# Patient Record
Sex: FEMALE | Race: WHITE | Employment: UNEMPLOYED | URBAN - METROPOLITAN AREA
[De-identification: names, ages, dates, MRNs, and addresses within clinical notes are randomized per-mention and may not be internally consistent; named-entity substitution may affect disease eponyms.]

---

## 2019-12-24 ENCOUNTER — APPOINTMENT (OUTPATIENT)
Dept: CT IMAGING | Age: 34
End: 2019-12-24
Attending: EMERGENCY MEDICINE
Payer: COMMERCIAL

## 2019-12-24 ENCOUNTER — HOSPITAL ENCOUNTER (EMERGENCY)
Age: 34
Discharge: HOME | End: 2019-12-24
Attending: EMERGENCY MEDICINE
Payer: COMMERCIAL

## 2019-12-24 VITALS
BODY MASS INDEX: 36.67 KG/M2 | TEMPERATURE: 97.7 F | HEIGHT: 66 IN | DIASTOLIC BLOOD PRESSURE: 83 MMHG | RESPIRATION RATE: 15 BRPM | HEART RATE: 42 BPM | WEIGHT: 228.18 LBS | OXYGEN SATURATION: 100 % | SYSTOLIC BLOOD PRESSURE: 115 MMHG

## 2019-12-24 DIAGNOSIS — R11.2 NON-INTRACTABLE VOMITING WITH NAUSEA, UNSPECIFIED VOMITING TYPE: ICD-10-CM

## 2019-12-24 DIAGNOSIS — R10.13 ABDOMINAL PAIN, EPIGASTRIC: Primary | ICD-10-CM

## 2019-12-24 LAB
ALBUMIN SERPL-MCNC: 4.4 G/DL (ref 3.5–5)
ALBUMIN/GLOB SERPL: 1 {RATIO} (ref 1.1–2.2)
ALP SERPL-CCNC: 99 U/L (ref 45–117)
ALT SERPL-CCNC: 76 U/L (ref 12–78)
ANION GAP SERPL CALC-SCNC: 11 MMOL/L (ref 5–15)
APPEARANCE UR: CLEAR
AST SERPL-CCNC: 62 U/L (ref 15–37)
BACTERIA URNS QL MICRO: NEGATIVE /HPF
BASOPHILS # BLD: 0 K/UL (ref 0–0.1)
BASOPHILS NFR BLD: 0 % (ref 0–1)
BILIRUB SERPL-MCNC: 0.5 MG/DL (ref 0.2–1)
BILIRUB UR QL: NEGATIVE
BUN SERPL-MCNC: 17 MG/DL (ref 6–20)
BUN/CREAT SERPL: 18 (ref 12–20)
CALCIUM SERPL-MCNC: 9.9 MG/DL (ref 8.5–10.1)
CHLORIDE SERPL-SCNC: 98 MMOL/L (ref 97–108)
CO2 SERPL-SCNC: 28 MMOL/L (ref 21–32)
COLOR UR: ABNORMAL
COMMENT, HOLDF: NORMAL
CREAT SERPL-MCNC: 0.95 MG/DL (ref 0.55–1.02)
DIFFERENTIAL METHOD BLD: ABNORMAL
EOSINOPHIL # BLD: 0 K/UL (ref 0–0.4)
EOSINOPHIL NFR BLD: 0 % (ref 0–7)
EPITH CASTS URNS QL MICRO: ABNORMAL /LPF
ERYTHROCYTE [DISTWIDTH] IN BLOOD BY AUTOMATED COUNT: 14.3 % (ref 11.5–14.5)
GLOBULIN SER CALC-MCNC: 4.5 G/DL (ref 2–4)
GLUCOSE SERPL-MCNC: 96 MG/DL (ref 65–100)
GLUCOSE UR STRIP.AUTO-MCNC: NEGATIVE MG/DL
HCG UR QL: NEGATIVE
HCT VFR BLD AUTO: 45.5 % (ref 35–47)
HGB BLD-MCNC: 14.8 G/DL (ref 11.5–16)
HGB UR QL STRIP: ABNORMAL
IMM GRANULOCYTES # BLD AUTO: 0 K/UL (ref 0–0.04)
IMM GRANULOCYTES NFR BLD AUTO: 0 % (ref 0–0.5)
KETONES UR QL STRIP.AUTO: 40 MG/DL
LEUKOCYTE ESTERASE UR QL STRIP.AUTO: NEGATIVE
LIPASE SERPL-CCNC: 214 U/L (ref 73–393)
LYMPHOCYTES # BLD: 1.2 K/UL (ref 0.8–3.5)
LYMPHOCYTES NFR BLD: 12 % (ref 12–49)
MCH RBC QN AUTO: 26.2 PG (ref 26–34)
MCHC RBC AUTO-ENTMCNC: 32.5 G/DL (ref 30–36.5)
MCV RBC AUTO: 80.7 FL (ref 80–99)
MONOCYTES # BLD: 0.4 K/UL (ref 0–1)
MONOCYTES NFR BLD: 4 % (ref 5–13)
NEUTS SEG # BLD: 8.3 K/UL (ref 1.8–8)
NEUTS SEG NFR BLD: 84 % (ref 32–75)
NITRITE UR QL STRIP.AUTO: NEGATIVE
NRBC # BLD: 0 K/UL (ref 0–0.01)
NRBC BLD-RTO: 0 PER 100 WBC
PH UR STRIP: 6 [PH] (ref 5–8)
PLATELET # BLD AUTO: 301 K/UL (ref 150–400)
PMV BLD AUTO: 10.2 FL (ref 8.9–12.9)
POTASSIUM SERPL-SCNC: 3.9 MMOL/L (ref 3.5–5.1)
PROT SERPL-MCNC: 8.9 G/DL (ref 6.4–8.2)
PROT UR STRIP-MCNC: NEGATIVE MG/DL
RBC # BLD AUTO: 5.64 M/UL (ref 3.8–5.2)
RBC #/AREA URNS HPF: ABNORMAL /HPF (ref 0–5)
SAMPLES BEING HELD,HOLD: NORMAL
SODIUM SERPL-SCNC: 137 MMOL/L (ref 136–145)
SP GR UR REFRACTOMETRY: 1.01 (ref 1–1.03)
UR CULT HOLD, URHOLD: NORMAL
UROBILINOGEN UR QL STRIP.AUTO: 0.2 EU/DL (ref 0.2–1)
WBC # BLD AUTO: 9.9 K/UL (ref 3.6–11)
WBC URNS QL MICRO: ABNORMAL /HPF (ref 0–4)

## 2019-12-24 PROCEDURE — 36415 COLL VENOUS BLD VENIPUNCTURE: CPT

## 2019-12-24 PROCEDURE — 74177 CT ABD & PELVIS W/CONTRAST: CPT

## 2019-12-24 PROCEDURE — 83690 ASSAY OF LIPASE: CPT

## 2019-12-24 PROCEDURE — 80053 COMPREHEN METABOLIC PANEL: CPT

## 2019-12-24 PROCEDURE — 93005 ELECTROCARDIOGRAM TRACING: CPT

## 2019-12-24 PROCEDURE — 96374 THER/PROPH/DIAG INJ IV PUSH: CPT

## 2019-12-24 PROCEDURE — 85025 COMPLETE CBC W/AUTO DIFF WBC: CPT

## 2019-12-24 PROCEDURE — 74011000250 HC RX REV CODE- 250: Performed by: EMERGENCY MEDICINE

## 2019-12-24 PROCEDURE — 99284 EMERGENCY DEPT VISIT MOD MDM: CPT

## 2019-12-24 PROCEDURE — 74011250637 HC RX REV CODE- 250/637: Performed by: EMERGENCY MEDICINE

## 2019-12-24 PROCEDURE — 74011000258 HC RX REV CODE- 258: Performed by: EMERGENCY MEDICINE

## 2019-12-24 PROCEDURE — 74011250636 HC RX REV CODE- 250/636: Performed by: EMERGENCY MEDICINE

## 2019-12-24 PROCEDURE — 81001 URINALYSIS AUTO W/SCOPE: CPT

## 2019-12-24 PROCEDURE — 96375 TX/PRO/DX INJ NEW DRUG ADDON: CPT

## 2019-12-24 PROCEDURE — 81025 URINE PREGNANCY TEST: CPT

## 2019-12-24 PROCEDURE — 74011636320 HC RX REV CODE- 636/320: Performed by: EMERGENCY MEDICINE

## 2019-12-24 RX ORDER — CITALOPRAM 20 MG/1
20 TABLET, FILM COATED ORAL DAILY
COMMUNITY
End: 2021-01-02

## 2019-12-24 RX ORDER — FENTANYL CITRATE 50 UG/ML
50 INJECTION, SOLUTION INTRAMUSCULAR; INTRAVENOUS ONCE
Status: COMPLETED | OUTPATIENT
Start: 2019-12-24 | End: 2019-12-24

## 2019-12-24 RX ORDER — SODIUM CHLORIDE 9 MG/ML
10 INJECTION INTRAMUSCULAR; INTRAVENOUS; SUBCUTANEOUS ONCE
Status: COMPLETED | OUTPATIENT
Start: 2019-12-24 | End: 2019-12-24

## 2019-12-24 RX ORDER — SODIUM CHLORIDE 0.9 % (FLUSH) 0.9 %
5-40 SYRINGE (ML) INJECTION AS NEEDED
Status: DISCONTINUED | OUTPATIENT
Start: 2019-12-24 | End: 2019-12-25 | Stop reason: HOSPADM

## 2019-12-24 RX ORDER — OMEPRAZOLE 20 MG/1
20 CAPSULE, DELAYED RELEASE ORAL DAILY
COMMUNITY

## 2019-12-24 RX ORDER — ONDANSETRON 2 MG/ML
4 INJECTION INTRAMUSCULAR; INTRAVENOUS
Status: COMPLETED | OUTPATIENT
Start: 2019-12-24 | End: 2019-12-24

## 2019-12-24 RX ORDER — SODIUM CHLORIDE 0.9 % (FLUSH) 0.9 %
5-40 SYRINGE (ML) INJECTION EVERY 8 HOURS
Status: DISCONTINUED | OUTPATIENT
Start: 2019-12-24 | End: 2019-12-25 | Stop reason: HOSPADM

## 2019-12-24 RX ORDER — SODIUM CHLORIDE 9 MG/ML
50 INJECTION, SOLUTION INTRAVENOUS
Status: COMPLETED | OUTPATIENT
Start: 2019-12-24 | End: 2019-12-24

## 2019-12-24 RX ORDER — ONDANSETRON 4 MG/1
4 TABLET, ORALLY DISINTEGRATING ORAL
Qty: 12 TAB | Refills: 0 | OUTPATIENT
Start: 2019-12-24 | End: 2021-12-30

## 2019-12-24 RX ADMIN — SODIUM CHLORIDE 50 ML: 900 INJECTION, SOLUTION INTRAVENOUS at 20:40

## 2019-12-24 RX ADMIN — SODIUM CHLORIDE 1000 ML: 900 INJECTION, SOLUTION INTRAVENOUS at 19:16

## 2019-12-24 RX ADMIN — SODIUM CHLORIDE 10 ML: 9 INJECTION INTRAMUSCULAR; INTRAVENOUS; SUBCUTANEOUS at 20:40

## 2019-12-24 RX ADMIN — IOPAMIDOL 100 ML: 755 INJECTION, SOLUTION INTRAVENOUS at 20:39

## 2019-12-24 RX ADMIN — LIDOCAINE HYDROCHLORIDE 40 ML: 20 SOLUTION ORAL; TOPICAL at 21:15

## 2019-12-24 RX ADMIN — ONDANSETRON 4 MG: 2 INJECTION INTRAMUSCULAR; INTRAVENOUS at 19:16

## 2019-12-24 RX ADMIN — FENTANYL CITRATE 50 MCG: 50 INJECTION INTRAMUSCULAR; INTRAVENOUS at 19:56

## 2019-12-25 NOTE — DISCHARGE INSTRUCTIONS
Patient Education        Abdominal Pain: Care Instructions  Your Care Instructions    Abdominal pain has many possible causes. Some aren't serious and get better on their own in a few days. Others need more testing and treatment. If your pain continues or gets worse, you need to be rechecked and may need more tests to find out what is wrong. You may need surgery to correct the problem. Don't ignore new symptoms, such as fever, nausea and vomiting, urination problems, pain that gets worse, and dizziness. These may be signs of a more serious problem. Your doctor may have recommended a follow-up visit in the next 8 to 12 hours. If you are not getting better, you may need more tests or treatment. The doctor has checked you carefully, but problems can develop later. If you notice any problems or new symptoms, get medical treatment right away. Follow-up care is a key part of your treatment and safety. Be sure to make and go to all appointments, and call your doctor if you are having problems. It's also a good idea to know your test results and keep a list of the medicines you take. How can you care for yourself at home? · Rest until you feel better. · To prevent dehydration, drink plenty of fluids, enough so that your urine is light yellow or clear like water. Choose water and other caffeine-free clear liquids until you feel better. If you have kidney, heart, or liver disease and have to limit fluids, talk with your doctor before you increase the amount of fluids you drink. · If your stomach is upset, eat mild foods, such as rice, dry toast or crackers, bananas, and applesauce. Try eating several small meals instead of two or three large ones. · Wait until 48 hours after all symptoms have gone away before you have spicy foods, alcohol, and drinks that contain caffeine. · Do not eat foods that are high in fat. · Avoid anti-inflammatory medicines such as aspirin, ibuprofen (Advil, Motrin), and naproxen (Aleve). These can cause stomach upset. Talk to your doctor if you take daily aspirin for another health problem. When should you call for help? Call 911 anytime you think you may need emergency care. For example, call if:    · You passed out (lost consciousness).     · You pass maroon or very bloody stools.     · You vomit blood or what looks like coffee grounds.     · You have new, severe belly pain.    Call your doctor now or seek immediate medical care if:    · Your pain gets worse, especially if it becomes focused in one area of your belly.     · You have a new or higher fever.     · Your stools are black and look like tar, or they have streaks of blood.     · You have unexpected vaginal bleeding.     · You have symptoms of a urinary tract infection. These may include:  ? Pain when you urinate. ? Urinating more often than usual.  ? Blood in your urine.     · You are dizzy or lightheaded, or you feel like you may faint.    Watch closely for changes in your health, and be sure to contact your doctor if:    · You are not getting better after 1 day (24 hours). Where can you learn more? Go to http://yandywritewithamos.info/. Enter E833 in the search box to learn more about \"Abdominal Pain: Care Instructions. \"  Current as of: June 26, 2019  Content Version: 12.2  © 4440-2613 [employer] Care instructions adapted under license by [employer] (which disclaims liability or warranty for this information). If you have questions about a medical condition or this instruction, always ask your healthcare professional. [place] [address] any warranty or liability for your use of this information. Patient Education        Nausea and Vomiting: Care Instructions  Your Care Instructions    When you are nauseated, you may feel weak and sweaty and notice a lot of saliva in your mouth. Nausea often leads to vomiting.  Most of the time you do not need to worry about nausea and vomiting, but they can be signs of other illnesses. Two common causes of nausea and vomiting are stomach flu and food poisoning. Nausea and vomiting from viral stomach flu will usually start to improve within 24 hours. Nausea and vomiting from food poisoning may last from 12 to 48 hours. The doctor has checked you carefully, but problems can develop later. If you notice any problems or new symptoms, get medical treatment right away. Follow-up care is a key part of your treatment and safety. Be sure to make and go to all appointments, and call your doctor if you are having problems. It's also a good idea to know your test results and keep a list of the medicines you take. How can you care for yourself at home? · To prevent dehydration, drink plenty of fluids, enough so that your urine is light yellow or clear like water. Choose water and other caffeine-free clear liquids until you feel better. If you have kidney, heart, or liver disease and have to limit fluids, talk with your doctor before you increase the amount of fluids you drink. · Rest in bed until you feel better. · When you are able to eat, try clear soups, mild foods, and liquids until all symptoms are gone for 12 to 48 hours. Other good choices include dry toast, crackers, cooked cereal, and gelatin dessert, such as Jell-O. When should you call for help? Call 911 anytime you think you may need emergency care. For example, call if:    · You passed out (lost consciousness).    Call your doctor now or seek immediate medical care if:    · You have symptoms of dehydration, such as:  ? Dry eyes and a dry mouth. ? Passing only a little dark urine. ?  Feeling thirstier than usual.     · You have new or worsening belly pain.     · You have a new or higher fever.     · You vomit blood or what looks like coffee grounds.    Watch closely for changes in your health, and be sure to contact your doctor if:    · You have ongoing nausea and vomiting.     · Your vomiting is getting worse.     · Your vomiting lasts longer than 2 days.     · You are not getting better as expected. Where can you learn more? Go to http://yandy-amos.info/. Enter 25 494182 in the search box to learn more about \"Nausea and Vomiting: Care Instructions. \"  Current as of: June 26, 2019  Content Version: 12.2  © 7674-1690 [employer]. Care instructions adapted under license by [employer] (which disclaims liability or warranty for this information). If you have questions about a medical condition or this instruction, always ask your healthcare professional. [place] [address] any warranty or liability for your use of this information.

## 2019-12-25 NOTE — ED NOTES
Verbal shift change report given to Reza Segundo RN  (oncoming nurse) by Aracelis Bermudez RN (offgoing nurse). Report included the following information SBAR, ED Summary and MAR.

## 2019-12-25 NOTE — ED PROVIDER NOTES
History of GERD, depression, arthritis; presents accompanied by her  with complaints of \"heartburn\", nausea, vomiting, diarrhea. Her symptoms began a week ago. She has been taking Pepto-Bismol, Tums, generic Pepcid without sustained relief. She saw her primary care doctor earlier today and was prescribed omeprazole. Her symptoms worsened as the day went on. She states that she has been vomiting approximately 10 times a day for the past week. Today she estimates more than 20 times. She has had constant epigastric pain that radiates to her back. She reports watery diarrhea that is been blackish and green at times. She estimates 5 episodes of diarrhea per day. She states that she is status post appendectomy, cholecystectomy, and bilateral tubal ligation. She states that she has had pretty significant \"heartburn\" in the past but nothing that has lasted this long. Past Medical History:   Diagnosis Date    Arthritis     Depression     GERD (gastroesophageal reflux disease)     Heart murmur        Past Surgical History:   Procedure Laterality Date    HX APPENDECTOMY      HX CHOLECYSTECTOMY           History reviewed. No pertinent family history.     Social History     Socioeconomic History    Marital status: Not on file     Spouse name: Not on file    Number of children: Not on file    Years of education: Not on file    Highest education level: Not on file   Occupational History    Not on file   Social Needs    Financial resource strain: Not on file    Food insecurity:     Worry: Not on file     Inability: Not on file    Transportation needs:     Medical: Not on file     Non-medical: Not on file   Tobacco Use    Smoking status: Never Smoker    Smokeless tobacco: Never Used   Substance and Sexual Activity    Alcohol use: Not Currently     Frequency: Never    Drug use: Never    Sexual activity: Not on file   Lifestyle    Physical activity:     Days per week: Not on file Minutes per session: Not on file    Stress: Not on file   Relationships    Social connections:     Talks on phone: Not on file     Gets together: Not on file     Attends Anabaptist service: Not on file     Active member of club or organization: Not on file     Attends meetings of clubs or organizations: Not on file     Relationship status: Not on file    Intimate partner violence:     Fear of current or ex partner: Not on file     Emotionally abused: Not on file     Physically abused: Not on file     Forced sexual activity: Not on file   Other Topics Concern    Not on file   Social History Narrative    Not on file         ALLERGIES: Bee venom protein (honey bee)    Review of Systems   All other systems reviewed and are negative. Vitals:    12/24/19 1910   BP: (!) 163/100   Pulse: (!) 45   Resp: 20   Temp: 97.7 °F (36.5 °C)   SpO2: 99%   Weight: 103.5 kg (228 lb 2.8 oz)   Height: 5' 5.5\" (1.664 m)            Physical Exam  Vitals signs and nursing note reviewed. Constitutional:       Appearance: She is well-developed. Comments: Overweight; appears uncomfortable   HENT:      Head: Normocephalic and atraumatic. Eyes:      Conjunctiva/sclera: Conjunctivae normal.   Neck:      Musculoskeletal: Neck supple. Trachea: No tracheal deviation. Cardiovascular:      Rate and Rhythm: Normal rate and regular rhythm. Heart sounds: Normal heart sounds. No murmur. No friction rub. No gallop. Pulmonary:      Effort: Pulmonary effort is normal.      Breath sounds: Normal breath sounds. Abdominal:      Palpations: Abdomen is soft. Comments: Mild epigastric tenderness   Musculoskeletal:         General: No deformity. Skin:     General: Skin is warm and dry. Neurological:      Mental Status: She is alert.       Comments: oriented          MDM       Procedures    EKG: Sinus bradycardia; rate of 43; normal ST, Keith Romano MD  7:44 PM    Progress Note:  Results, treatment, and follow up plan have been discussed with patient/. Questions were answered. Little Howell MD    Assessment/plan: Epigastric pain, nausea, vomiting, diarrhea for the past week. I suspect gastritis; reassuring appearance and exam; VSS; CBC, CMP, lipase, abdominal CT ok; home with omeprazole, Zofran and PCP f/u.   Little Howell MD

## 2019-12-25 NOTE — ED TRIAGE NOTES
Triage: pt c/o epigastric/GERD pain and N/V starting x1 week ago. Pt was seen by PCP today and given omeprazole and citalopram but symptoms have not gotten better. Denies fever, urinary symptoms.

## 2019-12-26 LAB
ATRIAL RATE: 43 BPM
CALCULATED P AXIS, ECG09: 28 DEGREES
CALCULATED T AXIS, ECG11: 32 DEGREES
DIAGNOSIS, 93000: NORMAL
P-R INTERVAL, ECG05: 156 MS
Q-T INTERVAL, ECG07: 468 MS
QRS DURATION, ECG06: 76 MS
QTC CALCULATION (BEZET), ECG08: 395 MS
VENTRICULAR RATE, ECG03: 43 BPM

## 2021-01-02 ENCOUNTER — APPOINTMENT (OUTPATIENT)
Dept: CT IMAGING | Age: 36
End: 2021-01-02
Attending: EMERGENCY MEDICINE
Payer: COMMERCIAL

## 2021-01-02 ENCOUNTER — HOSPITAL ENCOUNTER (EMERGENCY)
Age: 36
Discharge: HOME | End: 2021-01-03
Attending: EMERGENCY MEDICINE
Payer: COMMERCIAL

## 2021-01-02 DIAGNOSIS — R19.7 DIARRHEA, UNSPECIFIED TYPE: ICD-10-CM

## 2021-01-02 DIAGNOSIS — R11.2 NON-INTRACTABLE VOMITING WITH NAUSEA, UNSPECIFIED VOMITING TYPE: Primary | ICD-10-CM

## 2021-01-02 LAB
ALBUMIN SERPL-MCNC: 3.6 G/DL (ref 3.5–5)
ALBUMIN/GLOB SERPL: 0.9 {RATIO} (ref 1.1–2.2)
ALP SERPL-CCNC: 82 U/L (ref 45–117)
ALT SERPL-CCNC: 16 U/L (ref 12–78)
ANION GAP SERPL CALC-SCNC: 8 MMOL/L (ref 5–15)
AST SERPL-CCNC: 27 U/L (ref 15–37)
BASOPHILS # BLD: 0 K/UL (ref 0–0.1)
BASOPHILS NFR BLD: 0 % (ref 0–1)
BILIRUB SERPL-MCNC: 0.2 MG/DL (ref 0.2–1)
BUN SERPL-MCNC: 20 MG/DL (ref 6–20)
BUN/CREAT SERPL: 19 (ref 12–20)
CALCIUM SERPL-MCNC: 8.8 MG/DL (ref 8.5–10.1)
CHLORIDE SERPL-SCNC: 105 MMOL/L (ref 97–108)
CO2 SERPL-SCNC: 28 MMOL/L (ref 21–32)
CREAT SERPL-MCNC: 1.07 MG/DL (ref 0.55–1.02)
DIFFERENTIAL METHOD BLD: NORMAL
EOSINOPHIL # BLD: 0.3 K/UL (ref 0–0.4)
EOSINOPHIL NFR BLD: 4 % (ref 0–7)
ERYTHROCYTE [DISTWIDTH] IN BLOOD BY AUTOMATED COUNT: 13.5 % (ref 11.5–14.5)
GLOBULIN SER CALC-MCNC: 3.8 G/DL (ref 2–4)
GLUCOSE SERPL-MCNC: 100 MG/DL (ref 65–100)
HCT VFR BLD AUTO: 39.5 % (ref 35–47)
HGB BLD-MCNC: 12.7 G/DL (ref 11.5–16)
IMM GRANULOCYTES # BLD AUTO: 0 K/UL (ref 0–0.04)
IMM GRANULOCYTES NFR BLD AUTO: 0 % (ref 0–0.5)
LIPASE SERPL-CCNC: 242 U/L (ref 73–393)
LYMPHOCYTES # BLD: 1.7 K/UL (ref 0.8–3.5)
LYMPHOCYTES NFR BLD: 23 % (ref 12–49)
MCH RBC QN AUTO: 26.9 PG (ref 26–34)
MCHC RBC AUTO-ENTMCNC: 32.2 G/DL (ref 30–36.5)
MCV RBC AUTO: 83.7 FL (ref 80–99)
MONOCYTES # BLD: 0.4 K/UL (ref 0–1)
MONOCYTES NFR BLD: 5 % (ref 5–13)
NEUTS SEG # BLD: 4.8 K/UL (ref 1.8–8)
NEUTS SEG NFR BLD: 68 % (ref 32–75)
NRBC # BLD: 0 K/UL (ref 0–0.01)
NRBC BLD-RTO: 0 PER 100 WBC
PLATELET # BLD AUTO: 214 K/UL (ref 150–400)
PMV BLD AUTO: 10.2 FL (ref 8.9–12.9)
POTASSIUM SERPL-SCNC: 4 MMOL/L (ref 3.5–5.1)
PROT SERPL-MCNC: 7.4 G/DL (ref 6.4–8.2)
RBC # BLD AUTO: 4.72 M/UL (ref 3.8–5.2)
SODIUM SERPL-SCNC: 141 MMOL/L (ref 136–145)
WBC # BLD AUTO: 7.2 K/UL (ref 3.6–11)

## 2021-01-02 PROCEDURE — 83690 ASSAY OF LIPASE: CPT

## 2021-01-02 PROCEDURE — 80053 COMPREHEN METABOLIC PANEL: CPT

## 2021-01-02 PROCEDURE — 99284 EMERGENCY DEPT VISIT MOD MDM: CPT

## 2021-01-02 PROCEDURE — 36415 COLL VENOUS BLD VENIPUNCTURE: CPT

## 2021-01-02 PROCEDURE — 85025 COMPLETE CBC W/AUTO DIFF WBC: CPT

## 2021-01-02 PROCEDURE — 74177 CT ABD & PELVIS W/CONTRAST: CPT

## 2021-01-02 PROCEDURE — 74011250636 HC RX REV CODE- 250/636: Performed by: EMERGENCY MEDICINE

## 2021-01-02 PROCEDURE — 96374 THER/PROPH/DIAG INJ IV PUSH: CPT

## 2021-01-02 PROCEDURE — 74011000636 HC RX REV CODE- 636

## 2021-01-02 RX ORDER — ESCITALOPRAM OXALATE 20 MG/1
20 TABLET ORAL DAILY
COMMUNITY

## 2021-01-02 RX ORDER — BUPROPION HYDROCHLORIDE 150 MG/1
150 TABLET ORAL
COMMUNITY

## 2021-01-02 RX ORDER — SODIUM CHLORIDE 0.9 % (FLUSH) 0.9 %
10 SYRINGE (ML) INJECTION
Status: COMPLETED | OUTPATIENT
Start: 2021-01-03 | End: 2021-01-02

## 2021-01-02 RX ORDER — ONDANSETRON 2 MG/ML
4 INJECTION INTRAMUSCULAR; INTRAVENOUS
Status: COMPLETED | OUTPATIENT
Start: 2021-01-02 | End: 2021-01-02

## 2021-01-02 RX ORDER — SODIUM CHLORIDE 9 MG/ML
50 INJECTION, SOLUTION INTRAVENOUS
Status: COMPLETED | OUTPATIENT
Start: 2021-01-03 | End: 2021-01-02

## 2021-01-02 RX ADMIN — IOPAMIDOL 100 ML: 755 INJECTION, SOLUTION INTRAVENOUS at 23:37

## 2021-01-02 RX ADMIN — ONDANSETRON 4 MG: 2 INJECTION INTRAMUSCULAR; INTRAVENOUS at 22:21

## 2021-01-02 RX ADMIN — Medication 10 ML: at 23:37

## 2021-01-02 RX ADMIN — SODIUM CHLORIDE 50 ML/HR: 900 INJECTION, SOLUTION INTRAVENOUS at 23:37

## 2021-01-02 NOTE — LETTER
[address] 
[facility] 
[address] [address] 
[phone] Work/School Note Date: 1/2/2021 To Whom It May concern: 
 
Debi George escorted his wife, Tita Jacobsen to the emergency department. She was seen and treated today in the emergency room by the following provider(s): 
Attending Provider: Obinna Calabrese MD.   
 
 
Sincerely, Fermin Lesch, RN

## 2021-01-03 VITALS
RESPIRATION RATE: 16 BRPM | HEART RATE: 60 BPM | DIASTOLIC BLOOD PRESSURE: 82 MMHG | WEIGHT: 273.37 LBS | SYSTOLIC BLOOD PRESSURE: 116 MMHG | BODY MASS INDEX: 45.55 KG/M2 | HEIGHT: 65 IN | TEMPERATURE: 98 F | OXYGEN SATURATION: 100 %

## 2021-01-03 RX ORDER — LOPERAMIDE HYDROCHLORIDE 2 MG/1
2 CAPSULE ORAL
Qty: 20 CAP | Refills: 0 | Status: SHIPPED | OUTPATIENT
Start: 2021-01-03 | End: 2021-01-03

## 2021-01-03 RX ORDER — DICYCLOMINE HYDROCHLORIDE 20 MG/1
20 TABLET ORAL EVERY 6 HOURS
Qty: 20 TAB | Refills: 0 | Status: SHIPPED | OUTPATIENT
Start: 2021-01-03

## 2021-01-03 RX ORDER — PROMETHAZINE HYDROCHLORIDE 25 MG/1
25 TABLET ORAL
Qty: 12 TAB | Refills: 0 | Status: SHIPPED | OUTPATIENT
Start: 2021-01-03 | End: 2021-01-03

## 2021-01-03 NOTE — ED NOTES
Dr. Tiana Stinson reviewed discharge instructions with the patient. The patient verbalized understanding. Patient ambulated out of the emergency department without assistance. Patient remains in pain, but is free of nausea. Patient is in no apparent distress.

## 2021-01-03 NOTE — ED TRIAGE NOTES
Patient presents to the emergency department reporting lower bilateral abdominal pain. Patient reports the pain has been ongoing for a couple of weeks. Patient states she was seen at [facility] and sent here.   Patient had negative urine pregnancy test and urinalysis at [facility].

## 2021-01-03 NOTE — ED PROVIDER NOTES
71-year-old female history of GERD and depression who with history of cholecystectomy and appendectomy with retained biliary stone sometime last year presents to the emergency department today with a chief complaint of a couple of weeks of abdominal pain. She has difficulty describing the pain but tells me that is generalized and dull and has been getting worse. It is associated with nausea without vomiting. She has had no fevers. She tells me she has had headaches associated with the weather change. The history is provided by the patient and medical records. Abdominal Pain   This is a new problem. The current episode started more than 1 week ago. The problem occurs constantly. The problem has not changed since onset. The pain is located in the generalized abdominal region. The pain is moderate. Associated symptoms include nausea and headaches. Pertinent negatives include no fever, no diarrhea, no vomiting, no constipation, no dysuria, no frequency, no hematuria, no arthralgias, no myalgias, no trauma, no chest pain and no back pain. The patient's surgical history includes appendectomy and cholecystectomy. Past Medical History:   Diagnosis Date    Arthritis     Depression     GERD (gastroesophageal reflux disease)     Heart murmur        Past Surgical History:   Procedure Laterality Date    HX APPENDECTOMY      HX CHOLECYSTECTOMY           History reviewed. No pertinent family history.     Social History     Socioeconomic History    Marital status:      Spouse name: Not on file    Number of children: Not on file    Years of education: Not on file    Highest education level: Not on file   Occupational History    Not on file   Social Needs    Financial resource strain: Not on file    Food insecurity     Worry: Not on file     Inability: Not on file    Transportation needs     Medical: Not on file     Non-medical: Not on file   Tobacco Use    Smoking status: Never Smoker    Smokeless tobacco: Never Used   Substance and Sexual Activity    Alcohol use: Not Currently     Frequency: Never    Drug use: Never    Sexual activity: Not on file   Lifestyle    Physical activity     Days per week: Not on file     Minutes per session: Not on file    Stress: Not on file   Relationships    Social connections     Talks on phone: Not on file     Gets together: Not on file     Attends Yazidi service: Not on file     Active member of club or organization: Not on file     Attends meetings of clubs or organizations: Not on file     Relationship status: Not on file    Intimate partner violence     Fear of current or ex partner: Not on file     Emotionally abused: Not on file     Physically abused: Not on file     Forced sexual activity: Not on file   Other Topics Concern    Not on file   Social History Narrative    Not on file         ALLERGIES: Bee venom protein (honey bee)    Review of Systems   Constitutional: Negative for fatigue and fever. HENT: Negative for sneezing and sore throat. Respiratory: Negative for cough and shortness of breath. Cardiovascular: Negative for chest pain and leg swelling. Gastrointestinal: Positive for abdominal pain and nausea. Negative for constipation, diarrhea and vomiting. Genitourinary: Negative for difficulty urinating, dysuria, frequency and hematuria. Musculoskeletal: Negative for arthralgias, back pain and myalgias. Skin: Negative for color change and rash. Neurological: Positive for headaches. Negative for weakness. Psychiatric/Behavioral: Negative for agitation and behavioral problems. Vitals:    01/02/21 2205   Weight: 124 kg (273 lb 5.9 oz)   Height: 5' 5\" (1.651 m)            Physical Exam  Constitutional:       General: She is not in acute distress. Appearance: She is well-developed. She is obese. She is not ill-appearing, toxic-appearing or diaphoretic. HENT:      Head: Normocephalic and atraumatic.       Nose: Nose normal. Mouth/Throat:      Mouth: Mucous membranes are moist.      Pharynx: Oropharynx is clear. Eyes:      Extraocular Movements: Extraocular movements intact. Conjunctiva/sclera: Conjunctivae normal.      Pupils: Pupils are equal, round, and reactive to light. Neck:      Musculoskeletal: Normal range of motion and neck supple. Cardiovascular:      Rate and Rhythm: Normal rate and regular rhythm. Pulses: Normal pulses. Heart sounds: Normal heart sounds. Pulmonary:      Effort: Pulmonary effort is normal. No respiratory distress. Breath sounds: Normal breath sounds. Abdominal:      General: There is no distension. Palpations: Abdomen is soft. There is no mass. Tenderness: There is generalized abdominal tenderness and tenderness in the left upper quadrant. There is no guarding or rebound. Musculoskeletal: Normal range of motion. General: No swelling, tenderness, deformity or signs of injury. Right lower leg: No edema. Left lower leg: No edema. Skin:     General: Skin is warm and dry. Capillary Refill: Capillary refill takes less than 2 seconds. Neurological:      General: No focal deficit present. Mental Status: She is alert and oriented to person, place, and time. Psychiatric:         Mood and Affect: Mood normal.         Behavior: Behavior normal.          MDM  Number of Diagnoses or Management Options  Diarrhea, unspecified type  Non-intractable vomiting with nausea, unspecified vomiting type  Diagnosis management comments: 18-year-old female presents as above with  abdominal pain. Her work-up has been reassuring with labs and CT scan. Plan to treat symptomatically and  discharg with instructions to follow-up with primary care and return if needed.        Amount and/or Complexity of Data Reviewed  Clinical lab tests: reviewed  Tests in the radiology section of CPT®: reviewed           Procedures

## 2021-04-04 LAB — SARS-COV-2, NAA: NOT DETECTED

## 2021-04-28 ENCOUNTER — OFFICE VISIT (OUTPATIENT)
Dept: CARDIOLOGY CLINIC | Age: 36
End: 2021-04-28
Payer: COMMERCIAL

## 2021-04-28 VITALS
RESPIRATION RATE: 13 BRPM | OXYGEN SATURATION: 97 % | SYSTOLIC BLOOD PRESSURE: 124 MMHG | DIASTOLIC BLOOD PRESSURE: 80 MMHG | WEIGHT: 280 LBS | BODY MASS INDEX: 46.65 KG/M2 | HEIGHT: 65 IN | HEART RATE: 58 BPM

## 2021-04-28 DIAGNOSIS — R01.1 MURMUR: ICD-10-CM

## 2021-04-28 DIAGNOSIS — E66.01 MORBID OBESITY (HCC): ICD-10-CM

## 2021-04-28 DIAGNOSIS — R06.02 SHORTNESS OF BREATH: ICD-10-CM

## 2021-04-28 DIAGNOSIS — R07.9 CHEST PAIN, UNSPECIFIED TYPE: Primary | ICD-10-CM

## 2021-04-28 PROBLEM — R06.09 DOE (DYSPNEA ON EXERTION): Status: ACTIVE | Noted: 2021-04-28

## 2021-04-28 PROCEDURE — 99244 OFF/OP CNSLTJ NEW/EST MOD 40: CPT | Performed by: SPECIALIST

## 2021-04-28 PROCEDURE — 93000 ELECTROCARDIOGRAM COMPLETE: CPT | Performed by: SPECIALIST

## 2021-04-28 NOTE — PROGRESS NOTES
HISTORY OF PRESENT ILLNESS  Gregorio Che is a 30 y.o. female. She is referred for evaluation of chest pain. She has been having episodes of chest pain as well as shortness of breath with exertion. These may have started as long as low as 3-1/2 years. It sounds like she saw a cardiologist at [facility]argas around that time who did some testing that was unremarkable. She may have been pregnant at that time. She also had spells as a child where she would suddenly become limp. She does not smoke or drink alcohol. She does have a family history of heart disease. Her blood pressure will occasionally go low at home in between 90 and 957 systolic. She thinks her cholesterol is well controlled and she does not have diabetes or treated hypertension. HPI  Patient Active Problem List   Diagnosis Code    Chest pain at rest R07.9    CORTEZ (dyspnea on exertion) R06.00    Morbid obesity (HCC) E66.01     Current Outpatient Medications   Medication Sig Dispense Refill    dicyclomine (BENTYL) 20 mg tablet Take 1 Tab by mouth every six (6) hours. 20 Tab 0    buPROPion XL (WELLBUTRIN XL) 150 mg tablet Take 150 mg by mouth every morning.  escitalopram oxalate (LEXAPRO) 20 mg tablet Take 20 mg by mouth daily.  omeprazole (PRILOSEC) 20 mg capsule Take 20 mg by mouth daily.  ondansetron (ZOFRAN ODT) 4 mg disintegrating tablet Take 1 Tab by mouth every eight (8) hours as needed for Nausea. 12 Tab 0     Past Medical History:   Diagnosis Date    Arthritis     Depression     GERD (gastroesophageal reflux disease)     Heart murmur      Past Surgical History:   Procedure Laterality Date    HX APPENDECTOMY      HX CHOLECYSTECTOMY         Review of Systems   Respiratory: Positive for shortness of breath. Cardiovascular: Positive for chest pain. Neurological: Positive for dizziness. All other systems reviewed and are negative.     Visit Vitals  /80 (BP 1 Location: Left arm, BP Patient Position: Sitting, BP Cuff Size: Adult)   Pulse (!) 58   Resp 13   Ht 5' 5\" (1.651 m)   Wt 280 lb (127 kg)   SpO2 97%   BMI 46.59 kg/m²       Physical Exam   Constitutional: She is oriented to person, place, and time. She appears well-nourished. HENT:   Head: Atraumatic. Eyes: Conjunctivae are normal.   Neck: Neck supple. Cardiovascular: Normal rate, regular rhythm and normal heart sounds. Exam reveals no gallop and no friction rub. No murmur heard. Pulmonary/Chest: Breath sounds normal. She has no wheezes. Abdominal: Bowel sounds are normal.   Musculoskeletal:         General: No edema. Neurological: She is oriented to person, place, and time. Skin: Skin is dry. Psychiatric: Her behavior is normal.   Nursing note and vitals reviewed. ASSESSMENT and PLAN  It seems like exertion precipitates most of her symptoms. However if she is mowing the lawn she will become short of breath and fatigue and then sit down and then had severe chest pain lasting several minutes. The chest pain does not occur until she sits down. She does have a history in the chart of gastroesophageal reflux. In order to understand whether or not this is heart disease I will have her return for stress echocardiogram with disposition to follow.

## 2021-05-28 ENCOUNTER — ANCILLARY PROCEDURE (OUTPATIENT)
Dept: CARDIOLOGY CLINIC | Age: 36
End: 2021-05-28
Payer: COMMERCIAL

## 2021-05-28 ENCOUNTER — TELEPHONE (OUTPATIENT)
Dept: CARDIOLOGY CLINIC | Age: 36
End: 2021-05-28

## 2021-05-28 VITALS
HEIGHT: 65 IN | BODY MASS INDEX: 46.65 KG/M2 | DIASTOLIC BLOOD PRESSURE: 80 MMHG | WEIGHT: 280 LBS | SYSTOLIC BLOOD PRESSURE: 110 MMHG

## 2021-05-28 LAB
ECHO AO ASC DIAM: 3.09 CM
ECHO AO ROOT DIAM: 3.16 CM
ECHO TV REGURGITANT MAX VELOCITY: 239.54 CM/S
ECHO TV REGURGITANT PEAK GRADIENT: 22.95 MMHG
STRESS ANGINA INDEX: 0
STRESS BASELINE DIAS BP: 80 MMHG
STRESS BASELINE HR: 75 BPM
STRESS BASELINE SYS BP: 110 MMHG
STRESS ESTIMATED WORKLOAD: 7 METS
STRESS EXERCISE DUR MIN: NORMAL
STRESS O2 SAT PEAK: 97 %
STRESS PEAK DIAS BP: 80 MMHG
STRESS PEAK SYS BP: 150 MMHG
STRESS PERCENT HR ACHIEVED: 79 %
STRESS POST PEAK HR: 146 BPM
STRESS RATE PRESSURE PRODUCT: NORMAL BPM*MMHG
STRESS ST DEPRESSION: 0 MM
STRESS ST ELEVATION: 0 MM
STRESS TARGET HR: 185 BPM

## 2021-05-28 PROCEDURE — 93351 STRESS TTE COMPLETE: CPT | Performed by: SPECIALIST

## 2021-05-28 NOTE — TELEPHONE ENCOUNTER
----- Message from Nusrat Bolivar MD sent at 5/28/2021 11:44 AM EDT -----  Stress echo normal but she did not go very long. I can see her back at some point if her symptoms do not improve.

## 2021-05-28 NOTE — TELEPHONE ENCOUNTER
Called patient. Verified patient's identity with two identifiers. Notified patient of results and Dr. Nina Bradley message. Patient verbalized understanding and denied further questions or concerns.

## 2021-12-30 ENCOUNTER — APPOINTMENT (OUTPATIENT)
Dept: GENERAL RADIOLOGY | Age: 36
End: 2021-12-30
Attending: EMERGENCY MEDICINE
Payer: COMMERCIAL

## 2021-12-30 ENCOUNTER — HOSPITAL ENCOUNTER (EMERGENCY)
Age: 36
Discharge: HOME | End: 2021-12-30
Attending: EMERGENCY MEDICINE
Payer: COMMERCIAL

## 2021-12-30 ENCOUNTER — APPOINTMENT (OUTPATIENT)
Dept: GENERAL RADIOLOGY | Age: 36
End: 2021-12-30
Attending: STUDENT IN AN ORGANIZED HEALTH CARE EDUCATION/TRAINING PROGRAM
Payer: COMMERCIAL

## 2021-12-30 VITALS
RESPIRATION RATE: 16 BRPM | SYSTOLIC BLOOD PRESSURE: 128 MMHG | HEIGHT: 65 IN | TEMPERATURE: 98.2 F | BODY MASS INDEX: 46.59 KG/M2 | HEART RATE: 58 BPM | OXYGEN SATURATION: 99 % | DIASTOLIC BLOOD PRESSURE: 65 MMHG

## 2021-12-30 DIAGNOSIS — S82.831A CLOSED FRACTURE OF DISTAL END OF RIGHT FIBULA, UNSPECIFIED FRACTURE MORPHOLOGY, INITIAL ENCOUNTER: Primary | ICD-10-CM

## 2021-12-30 PROCEDURE — 73562 X-RAY EXAM OF KNEE 3: CPT

## 2021-12-30 PROCEDURE — 74011250637 HC RX REV CODE- 250/637: Performed by: EMERGENCY MEDICINE

## 2021-12-30 PROCEDURE — 73590 X-RAY EXAM OF LOWER LEG: CPT

## 2021-12-30 PROCEDURE — 73610 X-RAY EXAM OF ANKLE: CPT

## 2021-12-30 PROCEDURE — 99283 EMERGENCY DEPT VISIT LOW MDM: CPT

## 2021-12-30 RX ORDER — HYDROCODONE BITARTRATE AND ACETAMINOPHEN 5; 325 MG/1; MG/1
1 TABLET ORAL
Status: COMPLETED | OUTPATIENT
Start: 2021-12-30 | End: 2021-12-30

## 2021-12-30 RX ORDER — ONDANSETRON 4 MG/1
4 TABLET, ORALLY DISINTEGRATING ORAL
Qty: 21 TABLET | Refills: 0 | Status: SHIPPED | OUTPATIENT
Start: 2021-12-30 | End: 2022-01-06

## 2021-12-30 RX ORDER — HYDROCODONE BITARTRATE AND ACETAMINOPHEN 5; 325 MG/1; MG/1
1 TABLET ORAL
Qty: 12 TABLET | Refills: 0 | Status: SHIPPED | OUTPATIENT
Start: 2021-12-30 | End: 2022-01-02

## 2021-12-30 RX ADMIN — HYDROCODONE BITARTRATE AND ACETAMINOPHEN 1 TABLET: 5; 325 TABLET ORAL at 15:47

## 2021-12-30 NOTE — ED PROVIDER NOTES
18-year-old female presents with right ankle pain. She slipped off of her porch at approximately 11:30 AM.  She did not hit her head or lose consciousness. She denies pain in any other location. Past Medical History:   Diagnosis Date    Arthritis     Depression     GERD (gastroesophageal reflux disease)     Heart murmur        Past Surgical History:   Procedure Laterality Date    HX APPENDECTOMY      HX CHOLECYSTECTOMY           No family history on file. Social History     Socioeconomic History    Marital status:      Spouse name: Not on file    Number of children: Not on file    Years of education: Not on file    Highest education level: Not on file   Occupational History    Not on file   Tobacco Use    Smoking status: Never Smoker    Smokeless tobacco: Never Used   Substance and Sexual Activity    Alcohol use: Not Currently    Drug use: Never    Sexual activity: Not on file   Other Topics Concern    Not on file   Social History Narrative    Not on file     Social Determinants of Health     Financial Resource Strain:     Difficulty of Paying Living Expenses: Not on file   Food Insecurity:     Worried About Running Out of Food in the Last Year: Not on file    Mary of Food in the Last Year: Not on file   Transportation Needs:     Lack of Transportation (Medical): Not on file    Lack of Transportation (Non-Medical):  Not on file   Physical Activity:     Days of Exercise per Week: Not on file    Minutes of Exercise per Session: Not on file   Stress:     Feeling of Stress : Not on file   Social Connections:     Frequency of Communication with Friends and Family: Not on file    Frequency of Social Gatherings with Friends and Family: Not on file    Attends Voodoo Services: Not on file    Active Member of Clubs or Organizations: Not on file    Attends Club or Organization Meetings: Not on file    Marital Status: Not on file   Intimate Partner Violence:     Fear of Current or Ex-Partner: Not on file    Emotionally Abused: Not on file    Physically Abused: Not on file    Sexually Abused: Not on file   Housing Stability:     Unable to Pay for Housing in the Last Year: Not on file    Number of Places Lived in the Last Year: Not on file    Unstable Housing in the Last Year: Not on file         ALLERGIES: Bee venom protein (honey bee)    Review of Systems   Constitutional: Negative for fever. HENT: Negative for rhinorrhea. Respiratory: Negative for shortness of breath. Cardiovascular: Negative for chest pain. Gastrointestinal: Negative for abdominal pain. Genitourinary: Negative for dysuria. Musculoskeletal: Positive for arthralgias. Skin: Negative for wound. Neurological: Negative for headaches. Psychiatric/Behavioral: Negative for confusion. Vitals:    12/30/21 1309   BP: 128/65   Pulse: (!) 58   Resp: 16   Temp: 98.2 °F (36.8 °C)   SpO2: 99%   Height: 5' 5\" (1.651 m)            Physical Exam  Vitals and nursing note reviewed. Constitutional:       General: She is not in acute distress. Appearance: Normal appearance. She is not ill-appearing, toxic-appearing or diaphoretic. HENT:      Head: Normocephalic and atraumatic. Eyes:      Extraocular Movements: Extraocular movements intact. Cardiovascular:      Rate and Rhythm: Normal rate. Pulses: Normal pulses. Pulmonary:      Effort: Pulmonary effort is normal. No respiratory distress. Abdominal:      General: There is no distension. Musculoskeletal:      Cervical back: Normal range of motion. Comments: Swelling of the right ankle with tenderness. Strong pedal pulse. Skin:     General: Skin is dry. Neurological:      Mental Status: She is alert and oriented to person, place, and time.    Psychiatric:         Mood and Affect: Mood normal.          MDM  Number of Diagnoses or Management Options  Closed fracture of distal end of right fibula, unspecified fracture morphology, initial encounter  Diagnosis management comments: X-rays demonstrate a fibular fracture. There is some questioning of a widened mortise on the medial aspect. No reduction will be required. The patient was placed in a posterior and stirrup splint and will be advised to follow-up with orthopedic surgery. Discussed my clinical impression(s), any labs and/or radiology results with the patient. I answered any questions and addressed any concerns. Discussed the importance of following up with their primary care physician and/or specialist(s). Discussed signs or symptoms that would warrant return back to the ER for further evaluation. The patient is agreeable with discharge.        Amount and/or Complexity of Data Reviewed  Tests in the radiology section of CPT®: reviewed and ordered           Procedures

## 2021-12-30 NOTE — DISCHARGE INSTRUCTIONS
Thank you for allowing us to provide you with medical care today. We realize that you have many choices for your emergency care needs. We thank you for choosing [facility]. Please choose us in the future for any continued health care needs. The exam and treatment you received in the Emergency Department were for an emergent problem and are not intended as complete care. It is important that you follow up with a doctor, nurse practitioner, or physician's assistant for ongoing care. If your symptoms worsen or you do not improve as expected and you are unable to reach your usual health care provider, you should return to the Emergency Department. We are available 24 hours a day. Please make an appointment with your health care provider(s) for follow up of your Emergency Department visit. Take this sheet with you when you go to your follow-up visit.

## 2021-12-30 NOTE — ED NOTES
Pt dc home with , in wheelchair, crutches in hand. NAD noted. Splint applied. Pain controlled. Pt understood discharge instructions.

## 2021-12-30 NOTE — ED TRIAGE NOTES
Pt CO she slipped off her porch this morning around 1130 am and injured her R ankle. Swelling noted, paiin noted. No obvious deformity noted. Pt unable to walk without pain. Pt in wheelchair.

## 2022-01-05 ENCOUNTER — ANESTHESIA (OUTPATIENT)
Dept: SURGERY | Age: 37
End: 2022-01-05
Payer: COMMERCIAL

## 2022-01-05 ENCOUNTER — APPOINTMENT (OUTPATIENT)
Dept: GENERAL RADIOLOGY | Age: 37
End: 2022-01-05
Attending: STUDENT IN AN ORGANIZED HEALTH CARE EDUCATION/TRAINING PROGRAM
Payer: COMMERCIAL

## 2022-01-05 ENCOUNTER — HOSPITAL ENCOUNTER (EMERGENCY)
Age: 37
Discharge: STILL ADMITTED OR ADMINISTRATIVE | End: 2022-01-05

## 2022-01-05 ENCOUNTER — HOSPITAL ENCOUNTER (OUTPATIENT)
Age: 37
Setting detail: OBSERVATION
Discharge: HOME | End: 2022-01-06
Attending: STUDENT IN AN ORGANIZED HEALTH CARE EDUCATION/TRAINING PROGRAM | Admitting: STUDENT IN AN ORGANIZED HEALTH CARE EDUCATION/TRAINING PROGRAM
Payer: COMMERCIAL

## 2022-01-05 ENCOUNTER — ANESTHESIA EVENT (OUTPATIENT)
Dept: SURGERY | Age: 37
End: 2022-01-05
Payer: COMMERCIAL

## 2022-01-05 DIAGNOSIS — S82.891A CLOSED FRACTURE OF RIGHT ANKLE, INITIAL ENCOUNTER: Primary | ICD-10-CM

## 2022-01-05 LAB
COVID-19 RAPID TEST, COVR: NOT DETECTED
HCG UR QL: NEGATIVE
SOURCE, COVRS: NORMAL

## 2022-01-05 PROCEDURE — C1713 ANCHOR/SCREW BN/BN,TIS/BN: HCPCS | Performed by: STUDENT IN AN ORGANIZED HEALTH CARE EDUCATION/TRAINING PROGRAM

## 2022-01-05 PROCEDURE — 76060000034 HC ANESTHESIA 1.5 TO 2 HR: Performed by: STUDENT IN AN ORGANIZED HEALTH CARE EDUCATION/TRAINING PROGRAM

## 2022-01-05 PROCEDURE — 77030035497 HC PIN EXT FIX TRNFX APEX STRY -C: Performed by: STUDENT IN AN ORGANIZED HEALTH CARE EDUCATION/TRAINING PROGRAM

## 2022-01-05 PROCEDURE — 87635 SARS-COV-2 COVID-19 AMP PRB: CPT

## 2022-01-05 PROCEDURE — 74011250636 HC RX REV CODE- 250/636: Performed by: ANESTHESIOLOGY

## 2022-01-05 PROCEDURE — 76000 FLUOROSCOPY <1 HR PHYS/QHP: CPT

## 2022-01-05 PROCEDURE — 74011250636 HC RX REV CODE- 250/636: Performed by: STUDENT IN AN ORGANIZED HEALTH CARE EDUCATION/TRAINING PROGRAM

## 2022-01-05 PROCEDURE — 77030035457: Performed by: STUDENT IN AN ORGANIZED HEALTH CARE EDUCATION/TRAINING PROGRAM

## 2022-01-05 PROCEDURE — 77030019908 HC STETH ESOPH SIMS -A: Performed by: ANESTHESIOLOGY

## 2022-01-05 PROCEDURE — 74011250637 HC RX REV CODE- 250/637: Performed by: STUDENT IN AN ORGANIZED HEALTH CARE EDUCATION/TRAINING PROGRAM

## 2022-01-05 PROCEDURE — 77030026438 HC STYL ET INTUB CARD -A: Performed by: ANESTHESIOLOGY

## 2022-01-05 PROCEDURE — 73600 X-RAY EXAM OF ANKLE: CPT

## 2022-01-05 PROCEDURE — 77030008684 HC TU ET CUF COVD -B: Performed by: ANESTHESIOLOGY

## 2022-01-05 PROCEDURE — 74011000250 HC RX REV CODE- 250: Performed by: REGISTERED NURSE

## 2022-01-05 PROCEDURE — 77030035462: Performed by: STUDENT IN AN ORGANIZED HEALTH CARE EDUCATION/TRAINING PROGRAM

## 2022-01-05 PROCEDURE — 74011250636 HC RX REV CODE- 250/636: Performed by: REGISTERED NURSE

## 2022-01-05 PROCEDURE — G0378 HOSPITAL OBSERVATION PER HR: HCPCS

## 2022-01-05 PROCEDURE — 77030034775 HC CLMP PIN EXT FIX ANG POST STRY -G: Performed by: STUDENT IN AN ORGANIZED HEALTH CARE EDUCATION/TRAINING PROGRAM

## 2022-01-05 PROCEDURE — 81025 URINE PREGNANCY TEST: CPT

## 2022-01-05 PROCEDURE — 77030040398 HC ROD EXT FIX HOFFMN 3 MRI STRY -F: Performed by: STUDENT IN AN ORGANIZED HEALTH CARE EDUCATION/TRAINING PROGRAM

## 2022-01-05 PROCEDURE — 77030034777 HC ROD EXT FIX CONN ANKL STRY -E: Performed by: STUDENT IN AN ORGANIZED HEALTH CARE EDUCATION/TRAINING PROGRAM

## 2022-01-05 PROCEDURE — 76010000153 HC OR TIME 1.5 TO 2 HR: Performed by: STUDENT IN AN ORGANIZED HEALTH CARE EDUCATION/TRAINING PROGRAM

## 2022-01-05 PROCEDURE — 2709999900 HC NON-CHARGEABLE SUPPLY: Performed by: STUDENT IN AN ORGANIZED HEALTH CARE EDUCATION/TRAINING PROGRAM

## 2022-01-05 PROCEDURE — 76210000016 HC OR PH I REC 1 TO 1.5 HR: Performed by: STUDENT IN AN ORGANIZED HEALTH CARE EDUCATION/TRAINING PROGRAM

## 2022-01-05 RX ORDER — FENTANYL CITRATE 50 UG/ML
25 INJECTION, SOLUTION INTRAMUSCULAR; INTRAVENOUS
Status: DISCONTINUED | OUTPATIENT
Start: 2022-01-05 | End: 2022-01-06 | Stop reason: HOSPADM

## 2022-01-05 RX ORDER — SODIUM CHLORIDE 0.9 % (FLUSH) 0.9 %
5-40 SYRINGE (ML) INJECTION AS NEEDED
Status: DISCONTINUED | OUTPATIENT
Start: 2022-01-05 | End: 2022-01-05 | Stop reason: HOSPADM

## 2022-01-05 RX ORDER — SODIUM CHLORIDE 0.9 % (FLUSH) 0.9 %
5-40 SYRINGE (ML) INJECTION EVERY 8 HOURS
Status: DISCONTINUED | OUTPATIENT
Start: 2022-01-05 | End: 2022-01-06 | Stop reason: HOSPADM

## 2022-01-05 RX ORDER — SODIUM CHLORIDE 9 MG/ML
125 INJECTION, SOLUTION INTRAVENOUS CONTINUOUS
Status: DISCONTINUED | OUTPATIENT
Start: 2022-01-06 | End: 2022-01-06 | Stop reason: HOSPADM

## 2022-01-05 RX ORDER — HYDROMORPHONE HYDROCHLORIDE 1 MG/ML
0.2 INJECTION, SOLUTION INTRAMUSCULAR; INTRAVENOUS; SUBCUTANEOUS
Status: DISCONTINUED | OUTPATIENT
Start: 2022-01-05 | End: 2022-01-06 | Stop reason: HOSPADM

## 2022-01-05 RX ORDER — FENTANYL CITRATE 50 UG/ML
INJECTION, SOLUTION INTRAMUSCULAR; INTRAVENOUS AS NEEDED
Status: DISCONTINUED | OUTPATIENT
Start: 2022-01-05 | End: 2022-01-05 | Stop reason: HOSPADM

## 2022-01-05 RX ORDER — SODIUM CHLORIDE, SODIUM LACTATE, POTASSIUM CHLORIDE, CALCIUM CHLORIDE 600; 310; 30; 20 MG/100ML; MG/100ML; MG/100ML; MG/100ML
25 INJECTION, SOLUTION INTRAVENOUS CONTINUOUS
Status: DISCONTINUED | OUTPATIENT
Start: 2022-01-05 | End: 2022-01-05 | Stop reason: HOSPADM

## 2022-01-05 RX ORDER — ONDANSETRON 2 MG/ML
4 INJECTION INTRAMUSCULAR; INTRAVENOUS AS NEEDED
Status: DISCONTINUED | OUTPATIENT
Start: 2022-01-05 | End: 2022-01-06 | Stop reason: HOSPADM

## 2022-01-05 RX ORDER — SODIUM CHLORIDE 0.9 % (FLUSH) 0.9 %
5-40 SYRINGE (ML) INJECTION AS NEEDED
Status: DISCONTINUED | OUTPATIENT
Start: 2022-01-05 | End: 2022-01-06 | Stop reason: HOSPADM

## 2022-01-05 RX ORDER — PROPOFOL 10 MG/ML
INJECTION, EMULSION INTRAVENOUS AS NEEDED
Status: DISCONTINUED | OUTPATIENT
Start: 2022-01-05 | End: 2022-01-05 | Stop reason: HOSPADM

## 2022-01-05 RX ORDER — ROCURONIUM BROMIDE 10 MG/ML
INJECTION, SOLUTION INTRAVENOUS AS NEEDED
Status: DISCONTINUED | OUTPATIENT
Start: 2022-01-05 | End: 2022-01-05 | Stop reason: HOSPADM

## 2022-01-05 RX ORDER — EPHEDRINE SULFATE/0.9% NACL/PF 50 MG/5 ML
SYRINGE (ML) INTRAVENOUS AS NEEDED
Status: DISCONTINUED | OUTPATIENT
Start: 2022-01-05 | End: 2022-01-05 | Stop reason: HOSPADM

## 2022-01-05 RX ORDER — ONDANSETRON 2 MG/ML
INJECTION INTRAMUSCULAR; INTRAVENOUS AS NEEDED
Status: DISCONTINUED | OUTPATIENT
Start: 2022-01-05 | End: 2022-01-05 | Stop reason: HOSPADM

## 2022-01-05 RX ORDER — CEFAZOLIN SODIUM/WATER 2 G/20 ML
SYRINGE (ML) INTRAVENOUS AS NEEDED
Status: DISCONTINUED | OUTPATIENT
Start: 2022-01-05 | End: 2022-01-05 | Stop reason: HOSPADM

## 2022-01-05 RX ORDER — MIDAZOLAM HYDROCHLORIDE 1 MG/ML
INJECTION, SOLUTION INTRAMUSCULAR; INTRAVENOUS AS NEEDED
Status: DISCONTINUED | OUTPATIENT
Start: 2022-01-05 | End: 2022-01-05 | Stop reason: HOSPADM

## 2022-01-05 RX ORDER — SUCCINYLCHOLINE CHLORIDE 20 MG/ML
INJECTION INTRAMUSCULAR; INTRAVENOUS AS NEEDED
Status: DISCONTINUED | OUTPATIENT
Start: 2022-01-05 | End: 2022-01-05 | Stop reason: HOSPADM

## 2022-01-05 RX ORDER — ROPIVACAINE HYDROCHLORIDE 5 MG/ML
INJECTION, SOLUTION EPIDURAL; INFILTRATION; PERINEURAL AS NEEDED
Status: DISCONTINUED | OUTPATIENT
Start: 2022-01-05 | End: 2022-01-05 | Stop reason: HOSPADM

## 2022-01-05 RX ORDER — LIDOCAINE HYDROCHLORIDE 20 MG/ML
INJECTION, SOLUTION EPIDURAL; INFILTRATION; INTRACAUDAL; PERINEURAL AS NEEDED
Status: DISCONTINUED | OUTPATIENT
Start: 2022-01-05 | End: 2022-01-05 | Stop reason: HOSPADM

## 2022-01-05 RX ORDER — DEXAMETHASONE SODIUM PHOSPHATE 4 MG/ML
INJECTION, SOLUTION INTRA-ARTICULAR; INTRALESIONAL; INTRAMUSCULAR; INTRAVENOUS; SOFT TISSUE AS NEEDED
Status: DISCONTINUED | OUTPATIENT
Start: 2022-01-05 | End: 2022-01-05 | Stop reason: HOSPADM

## 2022-01-05 RX ORDER — SODIUM CHLORIDE 0.9 % (FLUSH) 0.9 %
5-40 SYRINGE (ML) INJECTION EVERY 8 HOURS
Status: DISCONTINUED | OUTPATIENT
Start: 2022-01-05 | End: 2022-01-05 | Stop reason: HOSPADM

## 2022-01-05 RX ADMIN — HYDROMORPHONE HYDROCHLORIDE 0.2 MG: 1 INJECTION, SOLUTION INTRAMUSCULAR; INTRAVENOUS; SUBCUTANEOUS at 22:45

## 2022-01-05 RX ADMIN — MIDAZOLAM HYDROCHLORIDE 2 MG: 1 INJECTION, SOLUTION INTRAMUSCULAR; INTRAVENOUS at 20:55

## 2022-01-05 RX ADMIN — LIDOCAINE HYDROCHLORIDE 60 MG: 20 INJECTION, SOLUTION EPIDURAL; INFILTRATION; INTRACAUDAL; PERINEURAL at 21:03

## 2022-01-05 RX ADMIN — Medication 10 MG: at 21:26

## 2022-01-05 RX ADMIN — ROCURONIUM BROMIDE 10 MG: 10 INJECTION INTRAVENOUS at 21:03

## 2022-01-05 RX ADMIN — DEXAMETHASONE SODIUM PHOSPHATE 4 MG: 4 INJECTION, SOLUTION INTRAMUSCULAR; INTRAVENOUS at 21:20

## 2022-01-05 RX ADMIN — Medication 3 AMPULE: at 18:43

## 2022-01-05 RX ADMIN — Medication 10 MG: at 21:12

## 2022-01-05 RX ADMIN — SUCCINYLCHOLINE CHLORIDE 140 MG: 20 INJECTION, SOLUTION INTRAMUSCULAR; INTRAVENOUS at 21:03

## 2022-01-05 RX ADMIN — FENTANYL CITRATE 100 MCG: 50 INJECTION, SOLUTION INTRAMUSCULAR; INTRAVENOUS at 21:03

## 2022-01-05 RX ADMIN — Medication 2 G: at 20:55

## 2022-01-05 RX ADMIN — PROPOFOL 200 MG: 10 INJECTION, EMULSION INTRAVENOUS at 21:03

## 2022-01-05 RX ADMIN — SODIUM CHLORIDE, POTASSIUM CHLORIDE, SODIUM LACTATE AND CALCIUM CHLORIDE 25 ML/HR: 600; 310; 30; 20 INJECTION, SOLUTION INTRAVENOUS at 18:43

## 2022-01-05 RX ADMIN — SODIUM CHLORIDE, POTASSIUM CHLORIDE, SODIUM LACTATE AND CALCIUM CHLORIDE: 600; 310; 30; 20 INJECTION, SOLUTION INTRAVENOUS at 21:18

## 2022-01-05 RX ADMIN — ROPIVACAINE HYDROCHLORIDE 30 ML: 5 INJECTION, SOLUTION EPIDURAL; INFILTRATION; PERINEURAL at 19:44

## 2022-01-05 RX ADMIN — ONDANSETRON HYDROCHLORIDE 4 MG: 2 INJECTION, SOLUTION INTRAMUSCULAR; INTRAVENOUS at 21:20

## 2022-01-05 RX ADMIN — SODIUM CHLORIDE 125 ML/HR: 9 INJECTION, SOLUTION INTRAVENOUS at 23:18

## 2022-01-05 RX ADMIN — MIDAZOLAM HYDROCHLORIDE 5 MG: 1 INJECTION, SOLUTION INTRAMUSCULAR; INTRAVENOUS at 19:35

## 2022-01-06 VITALS
DIASTOLIC BLOOD PRESSURE: 53 MMHG | HEART RATE: 69 BPM | BODY MASS INDEX: 45.89 KG/M2 | SYSTOLIC BLOOD PRESSURE: 98 MMHG | TEMPERATURE: 97.6 F | HEIGHT: 66 IN | RESPIRATION RATE: 18 BRPM | OXYGEN SATURATION: 98 %

## 2022-01-06 LAB
ANION GAP SERPL CALC-SCNC: 6 MMOL/L (ref 5–15)
BUN SERPL-MCNC: 12 MG/DL (ref 6–20)
BUN/CREAT SERPL: 12 (ref 12–20)
CALCIUM SERPL-MCNC: 9.1 MG/DL (ref 8.5–10.1)
CHLORIDE SERPL-SCNC: 105 MMOL/L (ref 97–108)
CO2 SERPL-SCNC: 27 MMOL/L (ref 21–32)
CREAT SERPL-MCNC: 1.01 MG/DL (ref 0.55–1.02)
GLUCOSE SERPL-MCNC: 157 MG/DL (ref 65–100)
HGB BLD-MCNC: 12.6 G/DL (ref 11.5–16)
POTASSIUM SERPL-SCNC: 4.4 MMOL/L (ref 3.5–5.1)
SODIUM SERPL-SCNC: 138 MMOL/L (ref 136–145)

## 2022-01-06 PROCEDURE — 97161 PT EVAL LOW COMPLEX 20 MIN: CPT

## 2022-01-06 PROCEDURE — 74011250637 HC RX REV CODE- 250/637: Performed by: STUDENT IN AN ORGANIZED HEALTH CARE EDUCATION/TRAINING PROGRAM

## 2022-01-06 PROCEDURE — 80048 BASIC METABOLIC PNL TOTAL CA: CPT

## 2022-01-06 PROCEDURE — 74011000258 HC RX REV CODE- 258: Performed by: STUDENT IN AN ORGANIZED HEALTH CARE EDUCATION/TRAINING PROGRAM

## 2022-01-06 PROCEDURE — 97165 OT EVAL LOW COMPLEX 30 MIN: CPT

## 2022-01-06 PROCEDURE — 97530 THERAPEUTIC ACTIVITIES: CPT

## 2022-01-06 PROCEDURE — 74011250636 HC RX REV CODE- 250/636: Performed by: STUDENT IN AN ORGANIZED HEALTH CARE EDUCATION/TRAINING PROGRAM

## 2022-01-06 PROCEDURE — 97116 GAIT TRAINING THERAPY: CPT

## 2022-01-06 PROCEDURE — 85018 HEMOGLOBIN: CPT

## 2022-01-06 PROCEDURE — G0378 HOSPITAL OBSERVATION PER HR: HCPCS

## 2022-01-06 PROCEDURE — 74011000250 HC RX REV CODE- 250: Performed by: STUDENT IN AN ORGANIZED HEALTH CARE EDUCATION/TRAINING PROGRAM

## 2022-01-06 PROCEDURE — 94760 N-INVAS EAR/PLS OXIMETRY 1: CPT

## 2022-01-06 PROCEDURE — 36415 COLL VENOUS BLD VENIPUNCTURE: CPT

## 2022-01-06 RX ORDER — SODIUM CHLORIDE 0.9 % (FLUSH) 0.9 %
5-40 SYRINGE (ML) INJECTION AS NEEDED
Status: DISCONTINUED | OUTPATIENT
Start: 2022-01-06 | End: 2022-01-06 | Stop reason: HOSPADM

## 2022-01-06 RX ORDER — POLYETHYLENE GLYCOL 3350 17 G/17G
17 POWDER, FOR SOLUTION ORAL DAILY
Status: DISCONTINUED | OUTPATIENT
Start: 2022-01-06 | End: 2022-01-06 | Stop reason: HOSPADM

## 2022-01-06 RX ORDER — OXYCODONE HYDROCHLORIDE 5 MG/1
5 TABLET ORAL
Qty: 35 TABLET | Refills: 0 | Status: SHIPPED | OUTPATIENT
Start: 2022-01-06 | End: 2022-01-09

## 2022-01-06 RX ORDER — AMOXICILLIN 250 MG
1 CAPSULE ORAL 2 TIMES DAILY
Qty: 60 TABLET | Refills: 0 | Status: SHIPPED | OUTPATIENT
Start: 2022-01-06 | End: 2022-02-25

## 2022-01-06 RX ORDER — PANTOPRAZOLE SODIUM 40 MG/1
40 TABLET, DELAYED RELEASE ORAL
Status: DISCONTINUED | OUTPATIENT
Start: 2022-01-06 | End: 2022-01-06 | Stop reason: HOSPADM

## 2022-01-06 RX ORDER — ONDANSETRON 2 MG/ML
4 INJECTION INTRAMUSCULAR; INTRAVENOUS
Status: DISCONTINUED | OUTPATIENT
Start: 2022-01-06 | End: 2022-01-06 | Stop reason: HOSPADM

## 2022-01-06 RX ORDER — AMOXICILLIN 250 MG
1 CAPSULE ORAL 2 TIMES DAILY
Status: DISCONTINUED | OUTPATIENT
Start: 2022-01-06 | End: 2022-01-06 | Stop reason: HOSPADM

## 2022-01-06 RX ORDER — ASPIRIN 325 MG
325 TABLET, DELAYED RELEASE (ENTERIC COATED) ORAL 2 TIMES DAILY
Status: DISCONTINUED | OUTPATIENT
Start: 2022-01-06 | End: 2022-01-06 | Stop reason: HOSPADM

## 2022-01-06 RX ORDER — FACIAL-BODY WIPES
10 EACH TOPICAL DAILY PRN
Status: DISCONTINUED | OUTPATIENT
Start: 2022-01-07 | End: 2022-01-06 | Stop reason: HOSPADM

## 2022-01-06 RX ORDER — NALOXONE HYDROCHLORIDE 0.4 MG/ML
0.4 INJECTION, SOLUTION INTRAMUSCULAR; INTRAVENOUS; SUBCUTANEOUS AS NEEDED
Status: DISCONTINUED | OUTPATIENT
Start: 2022-01-06 | End: 2022-01-06 | Stop reason: HOSPADM

## 2022-01-06 RX ORDER — OXYCODONE HYDROCHLORIDE 5 MG/1
5 TABLET ORAL
Status: DISCONTINUED | OUTPATIENT
Start: 2022-01-06 | End: 2022-01-06 | Stop reason: HOSPADM

## 2022-01-06 RX ORDER — ONDANSETRON 4 MG/1
4 TABLET, ORALLY DISINTEGRATING ORAL
Status: DISCONTINUED | OUTPATIENT
Start: 2022-01-06 | End: 2022-01-06 | Stop reason: HOSPADM

## 2022-01-06 RX ORDER — BUPROPION HYDROCHLORIDE 150 MG/1
150 TABLET ORAL
Status: DISCONTINUED | OUTPATIENT
Start: 2022-01-06 | End: 2022-01-06 | Stop reason: HOSPADM

## 2022-01-06 RX ORDER — SODIUM CHLORIDE 0.9 % (FLUSH) 0.9 %
5-40 SYRINGE (ML) INJECTION EVERY 8 HOURS
Status: DISCONTINUED | OUTPATIENT
Start: 2022-01-06 | End: 2022-01-06 | Stop reason: HOSPADM

## 2022-01-06 RX ORDER — ASPIRIN 325 MG
325 TABLET, DELAYED RELEASE (ENTERIC COATED) ORAL 2 TIMES DAILY
Qty: 30 TABLET | Refills: 0 | Status: SHIPPED | OUTPATIENT
Start: 2022-01-06 | End: 2022-01-20

## 2022-01-06 RX ORDER — OXYCODONE HYDROCHLORIDE 5 MG/1
10 TABLET ORAL
Status: DISCONTINUED | OUTPATIENT
Start: 2022-01-06 | End: 2022-01-06 | Stop reason: HOSPADM

## 2022-01-06 RX ORDER — ACETAMINOPHEN 500 MG
500 TABLET ORAL
Qty: 40 TABLET | Refills: 0 | Status: SHIPPED | OUTPATIENT
Start: 2022-01-06

## 2022-01-06 RX ORDER — HYDROMORPHONE HYDROCHLORIDE 1 MG/ML
0.5 INJECTION, SOLUTION INTRAMUSCULAR; INTRAVENOUS; SUBCUTANEOUS
Status: DISCONTINUED | OUTPATIENT
Start: 2022-01-06 | End: 2022-01-06 | Stop reason: HOSPADM

## 2022-01-06 RX ORDER — DICYCLOMINE HYDROCHLORIDE 20 MG/1
20 TABLET ORAL EVERY 6 HOURS
Status: DISCONTINUED | OUTPATIENT
Start: 2022-01-06 | End: 2022-01-06 | Stop reason: HOSPADM

## 2022-01-06 RX ORDER — ESCITALOPRAM OXALATE 10 MG/1
20 TABLET ORAL DAILY
Status: DISCONTINUED | OUTPATIENT
Start: 2022-01-06 | End: 2022-01-06 | Stop reason: HOSPADM

## 2022-01-06 RX ADMIN — Medication 1 AMPULE: at 09:00

## 2022-01-06 RX ADMIN — POLYETHYLENE GLYCOL 3350 17 G: 17 POWDER, FOR SOLUTION ORAL at 08:46

## 2022-01-06 RX ADMIN — SODIUM CHLORIDE, PRESERVATIVE FREE 10 ML: 5 INJECTION INTRAVENOUS at 01:00

## 2022-01-06 RX ADMIN — ASPIRIN 325 MG: 325 TABLET, COATED ORAL at 08:47

## 2022-01-06 RX ADMIN — OXYCODONE 5 MG: 5 TABLET ORAL at 08:47

## 2022-01-06 RX ADMIN — OXYCODONE 10 MG: 5 TABLET ORAL at 18:13

## 2022-01-06 RX ADMIN — SODIUM CHLORIDE, PRESERVATIVE FREE 10 ML: 5 INJECTION INTRAVENOUS at 06:22

## 2022-01-06 RX ADMIN — CEFAZOLIN SODIUM 3 G: 10 INJECTION, POWDER, FOR SOLUTION INTRAVENOUS at 08:54

## 2022-01-06 RX ADMIN — CEFAZOLIN SODIUM 3 G: 10 INJECTION, POWDER, FOR SOLUTION INTRAVENOUS at 02:09

## 2022-01-06 RX ADMIN — DOCUSATE SODIUM 50MG AND SENNOSIDES 8.6MG 1 TABLET: 8.6; 5 TABLET, FILM COATED ORAL at 08:47

## 2022-01-06 RX ADMIN — OXYCODONE 5 MG: 5 TABLET ORAL at 13:09

## 2022-01-06 RX ADMIN — ASPIRIN 325 MG: 325 TABLET, COATED ORAL at 18:13

## 2022-01-06 RX ADMIN — SODIUM CHLORIDE, PRESERVATIVE FREE 10 ML: 5 INJECTION INTRAVENOUS at 13:03

## 2022-01-06 RX ADMIN — DOCUSATE SODIUM 50MG AND SENNOSIDES 8.6MG 1 TABLET: 8.6; 5 TABLET, FILM COATED ORAL at 18:13

## 2022-01-06 RX ADMIN — BUPROPION HYDROCHLORIDE 150 MG: 150 TABLET, EXTENDED RELEASE ORAL at 06:21

## 2022-01-06 RX ADMIN — SODIUM CHLORIDE 125 ML/HR: 9 INJECTION, SOLUTION INTRAVENOUS at 08:55

## 2022-01-06 RX ADMIN — DICYCLOMINE HYDROCHLORIDE 20 MG: 20 TABLET ORAL at 06:21

## 2022-01-06 RX ADMIN — ESCITALOPRAM OXALATE 20 MG: 10 TABLET ORAL at 08:47

## 2022-01-06 RX ADMIN — PANTOPRAZOLE SODIUM 40 MG: 40 TABLET, DELAYED RELEASE ORAL at 08:47

## 2022-01-06 NOTE — PROGRESS NOTES
Problem: Pressure Injury - Risk of  Goal: *Prevention of pressure injury  Description: Document Otilio Scale and appropriate interventions in the flowsheet.   1/6/2022 0045 by Joanne High  Outcome: Progressing Towards Goal  Note: Pressure Injury Interventions:  Sensory Interventions: Assess changes in LOC    Moisture Interventions: Absorbent underpads    Activity Interventions: Increase time out of bed    Mobility Interventions: HOB 30 degrees or less    Nutrition Interventions: Document food/fluid/supplement intake    Friction and Shear Interventions: Lift sheet             1/6/2022 0045 by Joanne High  Outcome: Progressing Towards Goal  Note: Pressure Injury Interventions:  Sensory Interventions: Assess changes in LOC    Moisture Interventions: Absorbent underpads    Activity Interventions: Increase time out of bed    Mobility Interventions: HOB 30 degrees or less    Nutrition Interventions: Document food/fluid/supplement intake    Friction and Shear Interventions: Lift sheet                Problem: Patient Education: Go to Patient Education Activity  Goal: Patient/Family Education  1/6/2022 0045 by Joanne High  Outcome: Progressing Towards Goal  1/6/2022 0045 by Joanne High  Outcome: Progressing Towards Goal     Problem: Patient Education: Go to Patient Education Activity  Goal: Patient/Family Education  Outcome: Progressing Towards Goal     Problem: Lower Extremity Fracture:Day of Admission  Goal: Off Pathway (Use only if patient is Off Pathway)  Outcome: Progressing Towards Goal  Goal: Activity/Safety  Outcome: Progressing Towards Goal  Goal: Consults, if ordered  Outcome: Progressing Towards Goal  Goal: Diagnostic Test/Procedures  Outcome: Progressing Towards Goal  Goal: Nutrition/Diet  Outcome: Progressing Towards Goal  Goal: Medications  Outcome: Progressing Towards Goal  Goal: Respiratory  Outcome: Progressing Towards Goal  Goal: Treatments/Interventions/Procedures  Outcome: Progressing Towards Goal  Goal: Psychosocial  Outcome: Progressing Towards Goal  Goal: *Optimal pain control at patient's stated goal  Outcome: Progressing Towards Goal  Goal: *Hemodynamically stable  Outcome: Progressing Towards Goal  Goal: *Adequate oxygenation  Outcome: Progressing Towards Goal     Problem: Lower Extremity Fracture:Day of Surgery (Intiate SCIP Measures for Post-op Care)  Goal: Off Pathway (Use only if patient is Off Pathway)  Outcome: Progressing Towards Goal  Goal: Activity/Safety  Outcome: Progressing Towards Goal  Goal: Consults, if ordered  Outcome: Progressing Towards Goal  Goal: Diagnostic Test/Procedures  Outcome: Progressing Towards Goal  Goal: Nutrition/Diet  Outcome: Progressing Towards Goal  Goal: Medications  Outcome: Progressing Towards Goal  Goal: Respiratory  Outcome: Progressing Towards Goal  Goal: Treatments/Interventions/Procedures  Outcome: Progressing Towards Goal  Goal: Psychosocial  Outcome: Progressing Towards Goal  Goal: *Optimal pain control at patient's stated goal  Outcome: Progressing Towards Goal  Goal: *Hemodynamically stable  Outcome: Progressing Towards Goal  Goal: *Adequate oxygenation  Outcome: Progressing Towards Goal     Problem: Lower Extremity Fracture:Post-op Day 1  Goal: Off Pathway (Use only if patient is Off Pathway)  Outcome: Progressing Towards Goal  Goal: Activity/Safety  Outcome: Progressing Towards Goal  Goal: Consults, if ordered  Outcome: Progressing Towards Goal  Goal: Diagnostic Test/Procedures  Outcome: Progressing Towards Goal  Goal: Nutrition/Diet  Outcome: Progressing Towards Goal  Goal: Discharge Planning  Outcome: Progressing Towards Goal  Goal: Medications  Outcome: Progressing Towards Goal  Goal: Respiratory  Outcome: Progressing Towards Goal  Goal: Treatments/Interventions/Procedures  Outcome: Progressing Towards Goal  Goal: Psychosocial  Outcome: Progressing Towards Goal  Goal: *Optimal pain control at patient's stated goal  Outcome: Progressing Towards Goal  Goal: *Hemodynamically stable  Outcome: Progressing Towards Goal  Goal: *Adequate oxygenation  Outcome: Progressing Towards Goal  Goal: *PT/INR within defined limits  Outcome: Progressing Towards Goal  Goal: *Demonstrates progressive activity  Outcome: Progressing Towards Goal     Problem: Lower Extremity Fracture:Post-op Day 2  Goal: Off Pathway (Use only if patient is Off Pathway)  Outcome: Progressing Towards Goal  Goal: Activity/Safety  Outcome: Progressing Towards Goal  Goal: Diagnostic Test/Procedures  Outcome: Progressing Towards Goal  Goal: Nutrition/Diet  Outcome: Progressing Towards Goal  Goal: Discharge Planning  Outcome: Progressing Towards Goal  Goal: Medications  Outcome: Progressing Towards Goal  Goal: Respiratory  Outcome: Progressing Towards Goal  Goal: Treatments/Interventions/Procedures  Outcome: Progressing Towards Goal  Goal: Psychosocial  Outcome: Progressing Towards Goal  Goal: *Optimal pain control at patient's stated goal  Outcome: Progressing Towards Goal  Goal: *Hemodynamically stable  Outcome: Progressing Towards Goal  Goal: *Adequate oxygenation  Outcome: Progressing Towards Goal  Goal: *PT/INR within defined limits  Outcome: Progressing Towards Goal  Goal: *Demonstrates progressive activity  Outcome: Progressing Towards Goal  Goal: *Voiding  Outcome: Progressing Towards Goal  Goal: *Bowel movement  Outcome: Progressing Towards Goal  Goal: *Tolerating diet  Outcome: Progressing Towards Goal     Problem: Lower Extremity Fracture:Post-op Day 3  Goal: Off Pathway (Use only if patient is Off Pathway)  Outcome: Progressing Towards Goal  Goal: Activity/Safety  Outcome: Progressing Towards Goal  Goal: Diagnostic Test/Procedures  Outcome: Progressing Towards Goal  Goal: Nutrition/Diet  Outcome: Progressing Towards Goal  Goal: Discharge Planning  Outcome: Progressing Towards Goal  Goal: Medications  Outcome: Progressing Towards Goal  Goal: Respiratory  Outcome: Progressing Towards Goal  Goal: Treatments/Interventions/Procedures  Outcome: Progressing Towards Goal  Goal: Psychosocial  Outcome: Progressing Towards Goal  Goal: *Optimal pain control at patient's stated goal  Outcome: Progressing Towards Goal  Goal: *Hemodynamically stable  Outcome: Progressing Towards Goal  Goal: *Adequate oxygenation  Outcome: Progressing Towards Goal  Goal: *PT/INR within defined limits  Outcome: Progressing Towards Goal  Goal: *Demonstrates progressive activity  Outcome: Progressing Towards Goal  Goal: *Voiding  Outcome: Progressing Towards Goal  Goal: *Bowel movement  Outcome: Progressing Towards Goal  Goal: *Tolerating diet  Outcome: Progressing Towards Goal     Problem: Lower Extremity Fracture:Post-op Day 4  Goal: Off Pathway (Use only if patient is Off Pathway)  Outcome: Progressing Towards Goal  Goal: Activity/Safety  Outcome: Progressing Towards Goal  Goal: Diagnostic Test/Procedures  Outcome: Progressing Towards Goal  Goal: Nutrition/Diet  Outcome: Progressing Towards Goal  Goal: Discharge Planning  Outcome: Progressing Towards Goal  Goal: Medications  Outcome: Progressing Towards Goal  Goal: Respiratory  Outcome: Progressing Towards Goal  Goal: Treatments/Interventions/Procedures  Outcome: Progressing Towards Goal  Goal: Psychosocial  Outcome: Progressing Towards Goal     Problem: Lower Extremity Fracture:Discharge Outcomes  Goal: *Hemodynamically stable  Outcome: Progressing Towards Goal  Goal: *Modified independence with transfers, ambulation on levels with assistance devices, stair climbing, ADL's  Outcome: Progressing Towards Goal  Goal: *Independent with active exercises  Outcome: Progressing Towards Goal  Goal: *Describes follow-up/return visits to physicians  Outcome: Progressing Towards Goal  Goal: *Tolerating diet  Outcome: Progressing Towards Goal  Goal: *Optimal pain control at patient's stated goal  Outcome: Progressing Towards Goal  Goal: *Adequate air exchange  Outcome: Progressing Towards Goal  Goal: *Lungs clear or at baseline  Outcome: Progressing Towards Goal  Goal: *Afebrile  Outcome: Progressing Towards Goal  Goal: *Incision intact without signs of infection, redness, warmth  Outcome: Progressing Towards Goal  Goal: *Absence of deep venous thrombosis signs and symptoms(Stroke Metric)  Outcome: Progressing Towards Goal  Goal: *Active bowel function  Outcome: Progressing Towards Goal  Goal: *Adequate urinary output  Outcome: Progressing Towards Goal  Goal: *Discharge anxiety minimal  Outcome: Progressing Towards Goal  Goal: *Describes available resources and support systems  Outcome: Progressing Towards Goal

## 2022-01-06 NOTE — PROGRESS NOTES
Problem: Self Care Deficits Care Plan (Adult)  Goal: *Acute Goals and Plan of Care (Insert Text)  Description: FUNCTIONAL STATUS PRIOR TO ADMISSION: Patient was independent and active without use of DME. Patient was independent for basic and instrumental ADLs. Patient fx R ankle 12/30 and has been completing transfers using crutches and w/c for household distances. Patient reported  had been assisting with bathing and occasional LB dressing, when needed. HOME SUPPORT: The patient lived with  and kids. Occupational Therapy Goals  Initiated 1/6/2022  1. Patient will perform grooming standing at sink while maintaining NWB status with modified independence within 7 day(s). 2.  Patient will perform lower body dressing using AE PRN with minimal assistance within 7 day(s). 3.  Patient will perform sponge bathing with modified independence within 7 day(s). 4.  Patient will perform toilet transfers with modified independence within 7 day(s). 5.  Patient will perform all aspects of toileting with modified independence within 7 day(s). Outcome: Not Met   OCCUPATIONAL THERAPY EVALUATION  Patient: Claudette Pelaez (29 y.o. female)  Date: 1/6/2022  Primary Diagnosis: Closed right ankle fracture [S82.891A]  Procedure(s) (LRB):  RIGHT ANKLE  EXTERNAL FIXATOR PLACEMENT (Right) 1 Day Post-Op   Precautions: NWB    ASSESSMENT  Based on the objective data described below, the patient presents with decreased independence in self-care and functional mobility secondary to NWB RLE, impaired balance, pain, and decreased activity tolerance. Patient s/p R ankle external fixator placement POD 1 with minimal c/o discomfort. Patient is functioning below her independent baseline for self-care and functional mobility, now completing self-care with independent to max assist and functional mobility with supervision to contact guard assist using rolling walker.  Patient received semisupine in bed and agreeable for therapy. Patient completed bed mobility with supervision and demonstrated intact sitting balance. Patient completed sit > stand with contact guard assist and took hop steps to chair using rolling walker. Patient was able to maintain NWB on RLE during transfer and tolerated well. Once seated, patient's legs reclined for comfort. Patient reported having w/c, crutches, and BSC available at home and has been using them since initial fx. Patient educated on having  continue to assist with LB dressing to manage over external fixator and indicated understanding. Patient was left sitting in chair with all needs met. Patient would benefit from additional acute OT session to focus on bathroom mobility/trasnfers using rolling walker. Anticipate patient can return home with assist from family and no skilled OT needs, pending progress. Current Level of Function Impacting Discharge (ADLs/self-care): independent to max assist for self-care, supervision to contact guard assist for functional mobility using rolling walker     Functional Outcome Measure: The patient scored 60/100 on the Barthel Index outcome measure which is indicative of 40% functional impairment in ADLs. Other factors to consider for discharge: fall risk, NWB RLE, good family support      Patient will benefit from skilled therapy intervention to address the above noted impairments. PLAN :  Recommendations and Planned Interventions: self care training, functional mobility training, therapeutic exercise, balance training, therapeutic activities, endurance activities, patient education, home safety training and family training/education    Frequency/Duration: Patient will be followed by occupational therapy 5 times a week to address goals. Recommendation for discharge: (in order for the patient to meet his/her long term goals)  No skilled occupational therapy/ follow up rehabilitation needs identified at this time.     This discharge recommendation:  Has not yet been discussed the attending provider and/or case management    IF patient discharges home will need the following DME: rolling walker        SUBJECTIVE:   Patient stated I've been managing at home and the wheelchair has been helpful.     OBJECTIVE DATA SUMMARY:   HISTORY:   Past Medical History:   Diagnosis Date    Arthritis     Depression     GERD (gastroesophageal reflux disease)     Heart murmur      Past Surgical History:   Procedure Laterality Date    HX APPENDECTOMY      HX CHOLECYSTECTOMY         Expanded or extensive additional review of patient history:     Home Situation  Home Environment: Private residence  # Steps to Enter: 3  Rails to Enter: Yes  Hand Rails : Bilateral  One/Two Story Residence: One story  Living Alone: No  Support Systems: Spouse/Significant Other,Child(rhea) (children 2 and 4 y. o)  Current DME Used/Available at Home: Wheelchair,Raised toilet seat,Commode, bedside,Crutches  Tub or Shower Type:  (sponge bathing )    Hand dominance: Right    EXAMINATION OF PERFORMANCE DEFICITS:  Cognitive/Behavioral Status:  Neurologic State: Alert  Orientation Level: Oriented X4  Cognition: Appropriate decision making; Appropriate for age attention/concentration; Appropriate safety awareness; Follows commands  Perception: Appears intact  Perseveration: No perseveration noted  Safety/Judgement: Awareness of environment; Fall prevention    Hearing: Auditory  Auditory Impairment: None    Vision/Perceptual:    Acuity: Within Defined Limits    Corrective Lenses: Glasses    Range of Motion:  AROM: Within functional limits    Strength:  Strength: Within functional limits    Coordination:  Coordination: Within functional limits  Fine Motor Skills-Upper: Left Intact; Right Intact    Gross Motor Skills-Upper: Left Intact; Right Intact    Tone & Sensation:  Tone: Normal  Sensation: Intact    Balance:  Sitting: Intact  Standing: Intact; With support    Functional Mobility and Transfers for ADLs:  Bed Mobility:  Rolling: Supervision  Supine to Sit: Supervision  Scooting: Supervision    Transfers:  Sit to Stand: Contact guard assistance  Stand to Sit: Contact guard assistance  Bed to Chair: Contact guard assistance    ADL Assessment:  Feeding: Independent  Oral Facial Hygiene/Grooming: Setup;Supervision (seated)  Bathing: Minimum assistance  Upper Body Dressing: Setup;Supervision  Lower Body Dressing: Maximum assistance  Toileting: Minimum assistance    ADL Intervention and task modifications:  Patient educated at length on home set-up modifications using AD/DME to increased independence during ADLs and transfers. Grooming  Position Performed: Seated in chair  Washing Hands: Set-up; Supervision  Cues: Verbal cues provided    Cognitive Retraining  Safety/Judgement: Awareness of environment; Fall prevention    Functional Measure:    Barthel Index:  Bathin  Bladder: 10  Bowels: 10  Groomin  Dressin  Feeding: 10  Mobility: 5  Stairs: 0  Toilet Use: 5  Transfer (Bed to Chair and Back): 10  Total: 60/100      The Barthel ADL Index: Guidelines  1. The index should be used as a record of what a patient does, not as a record of what a patient could do. 2. The main aim is to establish degree of independence from any help, physical or verbal, however minor and for whatever reason. 3. The need for supervision renders the patient not independent. 4. A patient's performance should be established using the best available evidence. Asking the patient, friends/relatives and nurses are the usual sources, but direct observation and common sense are also important. However direct testing is not needed. 5. Usually the patient's performance over the preceding 24-48 hours is important, but occasionally longer periods will be relevant. 6. Middle categories imply that the patient supplies over 50 per cent of the effort. 7. Use of aids to be independent is allowed.     Score Interpretation (from [facility] 1997)    Independent   60-79 Minimally independent   40-59 Partially dependent   20-39 Very dependent   <20 Totally dependent     -Mario Gates., Barthel, DAshleyW. (1965). Functional evaluation: the Barthel Index. [address] ([address] [place] [address] (1997). The Barthel activities of daily living index: self-reporting versus actual performance in the old (> or = 75 years). Journal of [address] [place] [address] 45(7), [facility], ANDERSON, Mathew Smalls., Pritesh Legariane. (1999). Measuring the change in disability after inpatient rehabilitation; comparison of the responsiveness of the Barthel Index and Functional [place] Measure. Journal of Neurology, Neurosurgery, and Psychiatry, 66(4), 873-028. TERRI Alvarez, WILLIAM Lamas, & Delano Saeed M.A. (2004) Assessment of post-stroke quality of life in cost-effectiveness studies: The usefulness of the Barthel Index and the EuroQoL-5D. Quality of Life Research, 13, 319-92     Based on the above components, the patient evaluation is determined to be of the following complexity level: LOW   Pain Rating:  Patient c/o RLE throbbing. Activity Tolerance:   Good    After treatment patient left in no apparent distress:    Sitting in chair and Call bell within reach    COMMUNICATION/EDUCATION:   The patients plan of care was discussed with: Physical therapist and Registered nurse. Home safety education was provided and the patient/caregiver indicated understanding., Patient/family have participated as able in goal setting and plan of care. , and Patient/family agree to work toward stated goals and plan of care. This patients plan of care is appropriate for delegation to [facility].     Thank you for this referral.  Abdon Pierre, OTR/L  Time Calculation: 20 mins

## 2022-01-06 NOTE — PERIOP NOTES
TRANSFER - OUT REPORT:    [facility] [address]  being transferred to [facility](unit) for routine post - op       Report consisted of patients Situation, Background, Assessment and   Recommendations(SBAR). Information from the following report(s) SBAR, Kardex, OR Summary, Intake/Output and MAR was reviewed with the receiving nurse. Opportunity for questions and clarification was provided.       Patient transported with:   Registered Nurse

## 2022-01-06 NOTE — PERIOP NOTES
Handoff Report from Operating Room to PACU    Report received from [address] [place] [address]  and Marcelina Hwang CRNA regarding [employer]. Surgeon(s):  Josias Bowen MD  And Procedure(s) (LRB):  RIGHT ANKLE  EXTERNAL FIXATOR PLACEMENT (Right)  confirmed   with allergies and dressings discussed. Anesthesia type, drugs, patient history, complications, estimated blood loss, vital signs, intake and output, and last pain medication  were reviewed.

## 2022-01-06 NOTE — DISCHARGE INSTRUCTIONS
Discharge Instructions: How to [address]  Surgery: Ankle Fracture, X-Fix  Surgeon:   Isabel Novak MD  Surgery Date:  1/5/2022     To relieve pain:     Elevate your leg!!!!! This will also keep swelling down. Rest for at least 20 minutes between activity or exercises   To keep track of your pain medications, write down what you take and when you take it.  The last dose of pain medication you got in the hospital was:     Medication    Dose    Date & Time           Choose your medications based on the pain scale below:     To keep your pain under control, take Tylenol every 6 hours for 14 days - even if you feel like you dont need it.  For mild to moderate pain (3-8 on pain scale), take one pain pill every 4 hours as needed.  To prevent nausea, take your pain medications with food. Pain Scale                 As your pain lessens:     Slowly start taking less pain medication. You may do this by waiting longer between doses or by taking smaller doses.  Stop using the pain medications as soon as you no longer need it, usually in 2-3 weeks.  Aspirin   To prevent blood clots, you will need to take Aspirin 325 mg twice a day for 30 days.  To prevent stomach upset or bleeding:   Take Pepcid 20 mg twice a day, or a similar home medication, while you are taking a blood thinner.  Do not take non-steroidal anti-inflammatory (NSAID) medications (Ibuprofen, Advil, Motrin, Naproxen, etc.)                                                 PIN CARE INSTRUCTIONS-- ONCE a day!!!     Clean the pins when they contact the skin with the CHG swab. make sure to remove any crusty pieces.        Wrap the pin where it touches the skin with medicated yellow guaze(zeroform)      Wrap the medicate guaze with cling/roller guaze(white)           DO NOTs:   Do not rub your surgical wound   Do not put lotion or oils on your wound.  Do not take a shower or tub bath or go swimming until your doctor says it is ok.  Keep your leg and all hardware clean and dry!!!!!!!!            To increase and promote healing:     Stop Smoking (or at least cut back on       Smoking).  Eat a well-balanced diet (high in protein       and vitamin C).  If you have a poor appetite, drink Ensure, Glucerna, or CarnationInstant Breakfast for the next 30 days.  If you are diabetic, you should control your blood         sugars to prevent infection and help your wound         to heal.       To prevent constipation,  drink plenty of fluid, and eat veggies.  While using pain medications, you should also take stool softeners and laxatives, such as Pericolace and Miralax.  If you are having too many bowel movements, then you may need  to stop taking the laxatives.  You should have a bowel movement 3-4 days after surgery and then at least every other day while on pain medication.  WEIGHT BEARING   None = you may not put any weight or pressure through that leg.  THERAPY   Will teach you how to:     Get in and out of the bed   Do exercises--- wiggle toes    Use a walker, crutches     You may also need to have occupational therapy (OT) work with you to help you practice:     Getting on and off the toilet   Self-care (brushing teeth, eating, bathing, etc)     If you go directly home, home health physical therapy will come the day after you leave the hospital.  They will visit about 4 times over the next couple of weeks to teach you how to get out of bed, to safely walk in your home, and to do your exercises.  NO DRIVING until your surgeon tells you it is ok.  You can return to work when cleared by a physician.  Please call your physician immediately if you have:     Constant bleeding from your wound.      Increasing redness or swelling around your wound (Some warmth, bruising and swelling is normal).  Change in wound drainage (increase in amount, color, or bad smell).  Change in mental status (unusual behavior)     Temperature over 101.5 degrees Fahrenheit     Increased pain, swelling, or redness in the calf (back of your lower leg), thigh, ankle or foot.  Emergency: CALL 911 if you have:   Shortness of breath   Chest pain when you cough or taking a deep breath     Please call your surgeons office at [place] [phone] for a follow up appointment.  You should call as soon as you get home or the next day after discharge. Ask to make an appointment in 1 weeks.

## 2022-01-06 NOTE — ANESTHESIA PROCEDURE NOTES
Right popliteal peripheral nerve block    Start time: 1/5/2022 7:35 PM  End time: 1/5/2022 7:45 PM  Performed by: Madi Retana MD  Authorized by: Madi Retana MD       Pre-procedure: Indications: at surgeon's request and post-op pain management    Preanesthetic Checklist: patient identified, risks and benefits discussed, site marked, timeout performed, anesthesia consent given and patient being monitored    Timeout Time: 19:35 EST          Block Type:   Block Type:  Popliteal  Laterality:  Right  Monitoring:  Standard ASA monitoring, frequent vital sign checks, heart rate, continuous pulse ox, responsive to questions and oxygen  Injection Technique:  Single shot  Procedures: ultrasound guided    Patient Position: supine  Prep: chlorhexidine    Location:  Lower thigh  Needle Type:  Stimuplex  Needle Gauge:  20 G  Needle Localization:  Anatomical landmarks and ultrasound guidance    Assessment:  Number of attempts:  1  Injection Assessment:  Incremental injection every 5 mL, no paresthesia, ultrasound image on chart, local visualized surrounding nerve on ultrasound, negative aspiration for blood and no intravascular symptoms  Patient tolerance:  Patient tolerated the procedure well with no immediate complications  Excellent visualization on US  30 ml 0.5% ropivacaine administered  Paresthesia encountered with needle manipulation, did not inject during paresthesia.  No paresthesias with injection of local anesthetic

## 2022-01-06 NOTE — PROGRESS NOTES
Pin care completed, 50/50 hydrogen peroxide and water to clean pins. Guaze wrapped around pins. covered with ACE wrap.  Patient tolerated well

## 2022-01-06 NOTE — BRIEF OP NOTE
Brief Postoperative Note    Patient: Alice Dennison  YOB: 1985  MRN: [id number]    Date of Procedure: 1/5/2022     Pre-Op Diagnosis: RIGHT ANKLE FRACTURE Dislocation    Post-Op Diagnosis: Same as preoperative diagnosis.       Procedure(s):  RIGHT ANKLE  EXTERNAL FIXATOR PLACEMENT    Surgeon(s):  Eliel Houston MD    Surgical Assistant: Surg Asst-1: Wagner Murray    Anesthesia: General     Estimated Blood Loss (mL): Minimal    Complications: None    Specimens: * No specimens in log *     Implants: * No implants in log *    Drains: * No LDAs found *    Findings: Improved alignment status-post external fixator placement    Electronically Signed by Say Pacheco MD on 1/5/2022 at 10:42 PM

## 2022-01-06 NOTE — ANESTHESIA PREPROCEDURE EVALUATION
Relevant Problems   RESPIRATORY SYSTEM   (+) CORTEZ (dyspnea on exertion)      ENDOCRINE   (+) Morbid obesity (HCC)       Anesthetic History   No history of anesthetic complications            Review of Systems / Medical History  Patient summary reviewed and pertinent labs reviewed    Pulmonary  Within defined limits                 Neuro/Psych         Psychiatric history     Cardiovascular  Within defined limits                Exercise tolerance: >4 METS     GI/Hepatic/Renal     GERD           Endo/Other        Morbid obesity and arthritis     Other Findings              Physical Exam    Airway  Mallampati: II  TM Distance: > 6 cm  Neck ROM: normal range of motion   Mouth opening: Normal     Cardiovascular  Regular rate and rhythm,  S1 and S2 normal,  no murmur, click, rub, or gallop  Rhythm: regular  Rate: normal         Dental         Pulmonary  Breath sounds clear to auscultation               Abdominal  GI exam deferred       Other Findings            Anesthetic Plan    ASA: 2  Anesthesia type: general and regional    Monitoring Plan: BIS      Induction: Intravenous  Anesthetic plan and risks discussed with: Patient

## 2022-01-06 NOTE — ANESTHESIA POSTPROCEDURE EVALUATION
Procedure(s):  RIGHT ANKLE  EXTERNAL FIXATOR PLACEMENT. general, regional    Anesthesia Post Evaluation      Multimodal analgesia: multimodal analgesia used between 6 hours prior to anesthesia start to PACU discharge  Patient location during evaluation: bedside  Patient participation: complete - patient participated  Level of consciousness: awake  Pain management: adequate  Airway patency: patent  Anesthetic complications: no  Cardiovascular status: acceptable  Respiratory status: acceptable  Hydration status: acceptable  Post anesthesia nausea and vomiting:  controlled  Final Post Anesthesia Temperature Assessment:  Normothermia (36.0-37.5 degrees C)      INITIAL Post-op Vital signs:   Vitals Value Taken Time   /52 01/05/22 2239   Temp 36.6 °C (97.8 °F) 01/05/22 2239   Pulse 86 01/05/22 2241   Resp 18 01/05/22 2241   SpO2 100 % 01/05/22 2241   Vitals shown include unvalidated device data.

## 2022-01-06 NOTE — PROGRESS NOTES
Patient status-post right ankle external fixation 1/5/22. Overall doing well. Pain controlled. Having some discomfort around her tibial pins. Block still working. No chest pain, SOB, nausea, vomiting, abdominal pain. Alert, NAD  Unlabored breathing  Ex-fix in place  Toes warm and perfused  Block still in effect    Plan:  - NWB RLE  - Daily pin care  - ASA BID for DVT prophylaxis  - Ancef x 2 doses post-op  - PT/OT today  - Plan for discharge home today or tomorrow pending pain control and patient's comfort level with being able to manage external fixator at home.   - Will follow up with me next week for skin check and to determine timing of definitive fixation    Gianni Sandoval MD  1/6/2022

## 2022-01-06 NOTE — PROGRESS NOTES
Problem: Mobility Impaired (Adult and Pediatric)  Goal: *Acute Goals and Plan of Care (Insert Text)  Description: FUNCTIONAL STATUS PRIOR TO ADMISSION: Patient was independent and active without use of DME. Using crutches and a W/C immediately prior to surgery. Reports difficulty with crutches. HOME SUPPORT PRIOR TO ADMISSION: The patient lived with her  and small children but did not require assist.    Physical Therapy Goals  Initiated 1/6/2022  1. Patient will move from supine to sit and sit to supine  in bed with modified independence within 7 day(s). 2.  Patient will transfer from bed to chair and chair to bed with modified independence using the least restrictive device within 7 day(s). 3.  Patient will perform sit to stand with modified independence within 7 day(s). 4.  Patient will ambulate with modified independence for 15 feet with the least restrictive device within 7 day(s). 5.  Patient will ascend/descend 3 stairs with 2 handrail(s) with modified independence within 7 day(s). 1/6/2022 1509 by Jean Paul Cruz, PT, DPT  Outcome: Progressing Towards Goal  1/6/2022 1238 by Jean Paul Cruz, PT, DPT  Outcome: Progressing Towards Goal   PHYSICAL THERAPY TREATMENT  Patient: Carmine Lee (29 y.o. female)  Date: 1/6/2022  Diagnosis: Closed right ankle fracture [S82.891A] <principal problem not specified>  Procedure(s) (LRB):  RIGHT ANKLE  EXTERNAL FIXATOR PLACEMENT (Right) 1 Day Post-Op  Precautions: NWB  Chart, physical therapy assessment, plan of care and goals were reviewed. ASSESSMENT  Patient continues with skilled PT services and is progressing towards goals. Patient demonstrated good progress towards goals with good progression of mobility with the RW. Cues provided multiple times to slow down for safety but patient required SBA-supervision for standing activity and was without LOB. Transported to stairs for stair training.  Attempted stair training with bilateral rails to ascend to simulate her home setup. She was unable to clear the left LE on initial attempt and partially buckled when cleared on the 2nd. This writer deferred any further stair training at present and provided other safe options. Provided a handout regarding stairs using a wheelchair and 2 assist for home. Also, discussed seated \"bumping\" up the stairs but concerned for her ability to stand again while maintaining NWB. Encouraged to use a low height chair and assist from family if this method is preferred. Finally, her  is a  and she works for [employer]. They report being able to recruit co-works for a lift assist to enter the home. The patient has adequate equipment to manage in the home including a wheelchair, a BSC, and a RW is ordered for delivery. The patient is safe to mobilize over short distances with a RW but concerns remain for stair completion. Discussed discharge options with the patient and her  including attempting stairs again in the am. Per discussion,  they preferred discharge today using a stair option above d/t impending frozen precipitation tomorrow. Patient is ready for discharge once her RW is delivered. Recommend HHPT follow up based on pm performance to improve mobility in the home and to further troubleshoot her home stairs. Current Level of Function Impacting Discharge (mobility/balance): SBA     Other factors to consider for discharge: typically home alone during the day, 2 small children (age 3 and 3)         PLAN :  Patient continues to benefit from skilled intervention to address the above impairments. Continue treatment per established plan of care. to address goals.     Recommendation for discharge: (in order for the patient to meet his/her long term goals)  Physical therapy at least 2 days/week in the home AND ensure assist and/or supervision for safety with functional mobility    This discharge recommendation:  Has been made in collaboration with the attending provider and/or case management    IF patient discharges home will need the following DME: rolling walker       SUBJECTIVE:   Patient stated Do you think doing the stairs again will make a difference?     OBJECTIVE DATA SUMMARY:   Critical Behavior:  Neurologic State: Alert  Orientation Level: Oriented X4  Cognition: Appropriate decision making,Appropriate for age attention/concentration,Appropriate safety awareness,Follows commands  Safety/Judgement: Awareness of environment,Fall prevention  Functional Mobility Training:  Bed Mobility:  Rolling: Supervision  Supine to Sit: Supervision     Scooting: Supervision        Transfers:  Sit to Stand: Supervision  Stand to Sit: Supervision  Stand Pivot Transfers: Contact guard assistance     Bed to Chair: Supervision                    Balance:  Sitting: Intact  Standing: Intact; With support  Ambulation/Gait Training:  Distance (ft): 15 Feet (ft)  Assistive Device: Gait belt;Walker, rolling  Ambulation - Level of Assistance: Stand-by assistance     Gait Description (WDL): Exceptions to WDL  Gait Abnormalities: Decreased step clearance              Speed/Caitlyn: Accelerated  Step Length: Left shortened                    Stairs:  Number of Stairs Trained: 1  Stairs - Level of Assistance: Moderate assistance;Assist X2   Rail Use: Both      Activity Tolerance:   Fair    After treatment patient left in no apparent distress:   Supine in bed and Call bell within reach    COMMUNICATION/COLLABORATION:   The patients plan of care was discussed with: Registered nurse.      Claudia Marroquin PT, DPT   Time Calculation: 44 mins

## 2022-01-06 NOTE — PROGRESS NOTES
End of Shift Note    Bedside shift change report given to Chandler Whitney RN (oncoming nurse) by Geraldine Junior (offgoing nurse). Report included the following information SBAR, Kardex, Procedure Summary, Intake/Output, MAR, Accordion, Recent Results and Med Rec Status    Shift worked:  night     Shift summary and any significant changes:     No complaints of pain overnight. VSS     Concerns for physician to address:  none     Zone phone for oncoming shift:   [phone]       Activity:  Activity Level: Bed Rest  Number times ambulated in hallways past shift: 0  Number of times OOB to chair past shift: 0    Cardiac:   Cardiac Monitoring: No      Cardiac Rhythm: Sinus Juan    Access:   Current line(s): PIV     Genitourinary:   Urinary status: voiding and external catheter    Respiratory:   O2 Device: None (Room air)  Chronic home O2 use?: NO  Incentive spirometer at bedside: NO     GI:     Current diet:  ADULT DIET Regular  Passing flatus: YES  Tolerating current diet: YES       Pain Management:   Patient states pain is manageable on current regimen: YES    Skin:  Otilio Score: 15  Interventions: float heels and increase time out of bed    Patient Safety:  Fall Score:  Total Score: 3  Interventions: assistive device (walker, cane, etc), gripper socks and pt to call before getting OOB  High Fall Risk: Yes    Length of Stay:  Expected LOS: - - -  Actual LOS: 0      Geraldine Junior

## 2022-01-07 NOTE — PROGRESS NOTES
DISCHARGE NOTE FROM [facility]    Patient determined to be stable for discharge by attending provider. I have reviewed the discharge instructions with the patient. They verbalized understanding and all questions were answered to their satisfaction. No complaints or further questions were expressed. Medications sent to pharmacy. Appropriate educational materials and medication side effect teaching were provided. PIV were removed prior to discharge. Patient did not discharge with any line, rodriguez, or drain. Personal items and valuables accounted for at discharge by patient and/or family: YES    Post-op patient: Yes- Patient given post-op discharge kit and instructed on use.        Martir Frank RN

## 2022-01-07 NOTE — OP NOTES
OPERATIVE REPORT     PATIENT:  [place]                                        MRN:  [id number]  :  1985  ADMITTING PHYSICIAN:  Juan Alberto Escobar MD  ______________________________________________________________________     DATE OF SURGERY:  2022  SURGEON:  Juan Alberto Escobar MD        ASSISTANT:       PREOPERATIVE DIAGNOSIS(ES):  Right ankle fracture dislocation  Right displaced Orosco C distal fibula fracture  Right syndesmosis disruption  Right ankle deltoid ligament rupture     POSTOPERATIVE DIAGNOSIS(ES):  Right ankle fracture dislocation  Right displaced Orosco C distal fibula fracture  Right syndesmosis disruption  Right ankle deltoid ligament rupture     PROCEDURE(S) PERFORMED:  Procedure(s):  RIGHT ANKLE  EXTERNAL FIXATOR PLACEMENT     FINDINGS:    ANESTHESIA:   General.     TOURNIQUET TIME:  * No tourniquets in log *     IMPLANTS:    Implant Name Type Inv. Item Serial No.  Lot No. LRB No. Used Action   [id number] pin   5018-5-150 SNEHA DIANDRA N/A Right 2 Implanted   Calc Pin   5050-4-300 SNEHA DIANDRA N/A Right 1 Implanted   R[employer]   4922-1-010  N/A Right 7 Implanted   Rios   4922-8-350 SNEHA DIANDRA N/A Right 1 Implanted   Rios   4922-8-300 SNEHA DIANDRA N/A Right 1 Implanted   Rios   4922-8-150 SNEHA DIANDRA N/A Right 2 Implanted   Pin to Rios   4922-1-020  N/A Right 1 Implanted   CLAMP PIN 5H 2-ANGL 30D POST    4922-2-240 [employer]NEHA [employer] DIV N/A Right 1 Implanted   PIN APEX HLF 30MM THRD [id number]   5023-3-090  N/A Right 1 Implanted          INDICATIONS FOR THE PROCEDURE:  The patient is a 45-year-old female who sustained a fall resulting in an unstable ankle fracture dislocation. She was initially seen in emergency room where she was splinted and recommended to follow-up in my clinic. In clinic on x-rays, she was noted to have a significant amount of lateral translation of the talus and near dislocation of her ankle.   Closed reduction was attempted in clinic, however was unsuccessful. Therefore, was felt that she would benefit from an external fixator until swelling subsided enough to allow for definitive fixation. After discussing the risks and benefits, she did elect proceed. DESCRIPTION OF PROCEDURE:    The correct patient, extremity, and operation were all identified in the preoperative holding area. I marked the operative extremity. A block was administered by the anesthesia team and the patient was taken back to the operating room and placed supine on the operating room table. General anesthesia was then induced. All bony prominences were well-padded. The operative extremity was then prepped and draped in the usual sterile fashion. A preoperative time-out was called and again the correct patient operation and extremity were all identified, preoperative antibiotics were given IV within 30 minutes of the incision, all parties were in agreement and we proceeded with the operation. We 1st proceeded with placement of the tibial pins. The tibial crest was identified and a small stab incision was made through the skin only. Blunt dissection was performed with a hemostat down to the level of the anterior medial border of the tibia. The 1st 5 mm tibial pin was then inserted. We confirmed placement on orthogonal fluoroscopic views and ensure that it was bicortical.  A 2nd tibial pin was then inserted through the guide for the 5 hole clamp. Sharp dissection was performed through the skin only. Blunt dissection was performed using a hemostat down the level the anteromedial border of the tibia. The 2nd 5 mm tibial head was then inserted. This is under fluoroscopic guidance and advanced until it was bicortical.  We confirmed the position of the pins on orthogonal fluoroscopic views. A calcaneal pin was then inserted from medial to lateral.  A small stab incision was performed through the skin only.   Blunt dissection was then carried out down have a medial border of the calcaneus. Care was made to ensure that we were away from the neurovascular bundle. The calcaneal pin was then advanced laterally. A small nick in the skin was made laterally in the area of the calcaneal pin and this was further advanced out the skin. Pin to bar clamps were then applied to the tibial pads as well as the calcaneal pin in a delta frame configuration. The proximal clamps were 1st tightened. The ankle was then held in a reduced position. We confirmed the reduction on orthogonal fluoroscopic views. The distal clamps were then tightened while the ankle was held in a reduced position. Following this, we noted her ankle was in improved position, however, the talus was still slightly translated laterally and there was still some medial clear space widening. In order to improve the stability of the frame, we elected to add crossbar to connect the medial and lateral bars as well as a 1st metatarsal pin. We localized the hallux MTP joint as well as the 1st TMT joint. A 4 mm pin was then placed into the 1st metatarsal ensuring that the pin was extra-articular. Bar to bar clamps were placed followed by the crossbar. An additional bar was then placed a connecting the 1st metatarsal pen to the crossbar. The ankle was again held in a reduced position, as confirmed on fluoroscopic views, and the clamps were tightened. We again obtained orthogonal fluoroscopic views to ensure satisfactory position of the ankle joint. We were satisfied with the reduction of the ankle joint in the position on both AP, mortise, and lateral views. Final fluoroscopic images were taken and independently interpreted by me to show maintenance of reduction of her ankle fracture dislocation. The pin sites were then covered with a Xeroform and gauze. Sterile Webril was then applied followed by an Ace wrap. She was then awoken from general anesthesia and transferred to PACU in stable condition.      COMPLICATIONS: None.     POSTOPERATIVE PLAN:  She will be strictly nonweightbearing on her right lower extremity. She will be on aspirin for DVT prophylaxis. We will plan to admit her overnight. She will obtain pain care teaching as well as work with physical therapy and occupational therapy. I will plan to see her back in clinic next week to check her swelling and to determine if we can proceed with definitive fixation.

## 2022-01-17 ENCOUNTER — ANESTHESIA EVENT (OUTPATIENT)
Dept: SURGERY | Age: 37
End: 2022-01-17
Payer: COMMERCIAL

## 2022-01-17 RX ORDER — OXYCODONE HYDROCHLORIDE 5 MG/1
5 TABLET ORAL
COMMUNITY
End: 2022-02-25

## 2022-01-17 NOTE — ADVANCED PRACTICE NURSE
TRAV 5 in PAT phone assessment. Pt admits to loud snoring, witnessed apnea while sleeping but states she has not been previously referred for a sleep apnea evaluation. Referral faxed to [facility] for sleep apnea evaluation. TRAV results faxed to PCP for FU.

## 2022-01-17 NOTE — PERIOP NOTES
[facility]  Ambulatory Surgery Unit  Pre-operative Instructions    Surgery/Procedure Date  Thursday, January 20, 2022            Tentative Arrival Time TBD      1. On the day of your surgery/procedure, please report to the Ambulatory Surgery Unit Registration Desk and sign in at your designated time. The Ambulatory Surgery Unit is located in [facility] on the [facility] side of the [facility] across from the [facility]. Please have all of your health insurance cards and a photo ID. 2. You must have someone with you to drive you home, as you should not drive a car for 24 hours following anesthesia. Please make arrangements for a responsible adult friend or family member to stay with you for at least the first 24 hours after your surgery. 3. Do not have anything to eat or drink (including water, gum, mints, coffee, juice) after 11:59 PM, Wednesday. This may not apply to medications prescribed by your physician. (Please note below the special instructions with medications to take the morning of surgery, if applicable.)    4. We recommend you do not drink any alcoholic beverages for 24 hours before and after your surgery. 5. Contact your surgeons office for instructions on the following medications: non-steroidal anti-inflammatory drugs (i.e. Advil, Aleve), vitamins, and supplements. (Some surgeons will want you to stop these medications prior to surgery and others may allow you to take them)   **If you are currently taking Plavix, Coumadin, Aspirin and/or other blood-thinning agents, contact your surgeon for instructions. ** Your surgeon will partner with the physician prescribing these medications to determine if it is safe to stop or if you need to continue taking. Please do not stop taking these medications without instructions from your surgeon.     6. In an effort to help prevent surgical site infection, we ask that you shower with an anti-bacterial soap (i.e. Dial/Safeguard, or the soap provided to you at your preadmission testing appointment) for 3 days prior to and on the morning of surgery, using a fresh towel after each shower. (Please begin this process with fresh bed linens.) Do not apply any lotions, powders, or deodorants after the shower on the day of your procedure. If applicable, please do not shave the operative site for 48 hours prior to surgery. 7. Wear comfortable clothes. Wear glasses instead of contacts. Do not bring any jewelry or money (other than copays or fees as instructed). Do not wear make-up, particularly mascara, the morning of your surgery. Do not wear nail polish, particularly if you are having foot /hand surgery. Wear your hair loose or down, no ponytails, buns, patricia pins or clips. All body piercings must be removed. 8. You should understand that if you do not follow these instructions your surgery may be cancelled. If your physical condition changes (i.e. fever, cold or flu) please contact your surgeon as soon as possible. 9. It is important that you be on time. If a situation occurs where you may be late, or if you have any questions or problems, please call [phone].    10. Your surgery time may be subject to change. You will receive a phone call the day prior to surgery to confirm your arrival time. 11. Pediatric patients: please bring a change of clothes, diapers, bottle/sippy cup, pacifier, etc.      Special Instructions: Take all medications and inhalers, as prescribed, on the morning of surgery with a sip of water. I understand a pre-operative phone call will be made to verify my surgery time. In the event that I am not available, I give permission for a message to be left on my answering service and/or with another person?       yes    Preop instructions reviewed  Pt verbalized understanding.      ___________________      ___________________      ________________  (Signature of Patient)          (Witness) (Date and Time)

## 2022-01-17 NOTE — PERIOP NOTES
azalea sent to Andria Haq NP, score 5, PCP Bernie Jasso, best number for pt [phone]. Reviewed chart and old cardiology work up with Dr. Meagan Whitman. Called pt, she reafirmed MET>4, stated she would not have CP or CORTEZ with going up 10-12 steps. Okay to proceed, no new orders.

## 2022-01-20 ENCOUNTER — APPOINTMENT (OUTPATIENT)
Dept: GENERAL RADIOLOGY | Age: 37
End: 2022-01-20
Attending: STUDENT IN AN ORGANIZED HEALTH CARE EDUCATION/TRAINING PROGRAM
Payer: COMMERCIAL

## 2022-01-20 ENCOUNTER — ANESTHESIA (OUTPATIENT)
Dept: SURGERY | Age: 37
End: 2022-01-20
Payer: COMMERCIAL

## 2022-01-20 ENCOUNTER — HOSPITAL ENCOUNTER (OUTPATIENT)
Age: 37
Setting detail: OUTPATIENT SURGERY
Discharge: HOME | End: 2022-01-20
Attending: STUDENT IN AN ORGANIZED HEALTH CARE EDUCATION/TRAINING PROGRAM | Admitting: STUDENT IN AN ORGANIZED HEALTH CARE EDUCATION/TRAINING PROGRAM
Payer: COMMERCIAL

## 2022-01-20 VITALS
BODY MASS INDEX: 46.65 KG/M2 | RESPIRATION RATE: 16 BRPM | HEIGHT: 65 IN | TEMPERATURE: 97.5 F | SYSTOLIC BLOOD PRESSURE: 145 MMHG | OXYGEN SATURATION: 98 % | DIASTOLIC BLOOD PRESSURE: 85 MMHG | WEIGHT: 280 LBS | HEART RATE: 75 BPM

## 2022-01-20 DIAGNOSIS — S82.891D CLOSED FRACTURE OF RIGHT ANKLE WITH ROUTINE HEALING, SUBSEQUENT ENCOUNTER: Primary | ICD-10-CM

## 2022-01-20 LAB
COVID-19 RAPID TEST, COVR: NOT DETECTED
HCG UR QL: NEGATIVE
SOURCE, COVRS: NORMAL

## 2022-01-20 PROCEDURE — 87635 SARS-COV-2 COVID-19 AMP PRB: CPT

## 2022-01-20 PROCEDURE — 74011250636 HC RX REV CODE- 250/636: Performed by: ANESTHESIOLOGY

## 2022-01-20 PROCEDURE — 77030020268 HC MISC GENERAL SUPPLY: Performed by: STUDENT IN AN ORGANIZED HEALTH CARE EDUCATION/TRAINING PROGRAM

## 2022-01-20 PROCEDURE — C1713 ANCHOR/SCREW BN/BN,TIS/BN: HCPCS | Performed by: STUDENT IN AN ORGANIZED HEALTH CARE EDUCATION/TRAINING PROGRAM

## 2022-01-20 PROCEDURE — 73600 X-RAY EXAM OF ANKLE: CPT

## 2022-01-20 PROCEDURE — 76000 FLUOROSCOPY <1 HR PHYS/QHP: CPT

## 2022-01-20 PROCEDURE — 74011250636 HC RX REV CODE- 250/636: Performed by: NURSE ANESTHETIST, CERTIFIED REGISTERED

## 2022-01-20 PROCEDURE — 77030018673: Performed by: STUDENT IN AN ORGANIZED HEALTH CARE EDUCATION/TRAINING PROGRAM

## 2022-01-20 PROCEDURE — 76060000066 HC AMB SURG ANES 3 TO 3.5 HR: Performed by: STUDENT IN AN ORGANIZED HEALTH CARE EDUCATION/TRAINING PROGRAM

## 2022-01-20 PROCEDURE — 74011250636 HC RX REV CODE- 250/636: Performed by: STUDENT IN AN ORGANIZED HEALTH CARE EDUCATION/TRAINING PROGRAM

## 2022-01-20 PROCEDURE — 77030039135 HC KT DISP FIBRTAK ARTH -D: Performed by: STUDENT IN AN ORGANIZED HEALTH CARE EDUCATION/TRAINING PROGRAM

## 2022-01-20 PROCEDURE — 77030020274 HC MISC IMPL ORTHOPEDIC: Performed by: STUDENT IN AN ORGANIZED HEALTH CARE EDUCATION/TRAINING PROGRAM

## 2022-01-20 PROCEDURE — 77030019908 HC STETH ESOPH SIMS -A: Performed by: ANESTHESIOLOGY

## 2022-01-20 PROCEDURE — 76210000046 HC AMBSU PH II REC FIRST 0.5 HR: Performed by: STUDENT IN AN ORGANIZED HEALTH CARE EDUCATION/TRAINING PROGRAM

## 2022-01-20 PROCEDURE — 77030002916 HC SUT ETHLN J&J -A: Performed by: STUDENT IN AN ORGANIZED HEALTH CARE EDUCATION/TRAINING PROGRAM

## 2022-01-20 PROCEDURE — 77030003029 HC SUT VCRL J&J -B: Performed by: STUDENT IN AN ORGANIZED HEALTH CARE EDUCATION/TRAINING PROGRAM

## 2022-01-20 PROCEDURE — 76210000035 HC AMBSU PH I REC 1 TO 1.5 HR: Performed by: STUDENT IN AN ORGANIZED HEALTH CARE EDUCATION/TRAINING PROGRAM

## 2022-01-20 PROCEDURE — 77030021352 HC CBL LD SYS DISP COVD -B: Performed by: STUDENT IN AN ORGANIZED HEALTH CARE EDUCATION/TRAINING PROGRAM

## 2022-01-20 PROCEDURE — 77030026438 HC STYL ET INTUB CARD -A: Performed by: ANESTHESIOLOGY

## 2022-01-20 PROCEDURE — 77030008684 HC TU ET CUF COVD -B: Performed by: ANESTHESIOLOGY

## 2022-01-20 PROCEDURE — 77030002933 HC SUT MCRYL J&J -A: Performed by: STUDENT IN AN ORGANIZED HEALTH CARE EDUCATION/TRAINING PROGRAM

## 2022-01-20 PROCEDURE — 76030000006 HC AMB SURG OR TIME 3 TO 3.5: Performed by: STUDENT IN AN ORGANIZED HEALTH CARE EDUCATION/TRAINING PROGRAM

## 2022-01-20 PROCEDURE — 74011000250 HC RX REV CODE- 250: Performed by: NURSE ANESTHETIST, CERTIFIED REGISTERED

## 2022-01-20 PROCEDURE — 81025 URINE PREGNANCY TEST: CPT

## 2022-01-20 PROCEDURE — 77030003028 HC SUT VCRL J&J -A: Performed by: STUDENT IN AN ORGANIZED HEALTH CARE EDUCATION/TRAINING PROGRAM

## 2022-01-20 PROCEDURE — 77030031139 HC SUT VCRL2 J&J -A: Performed by: STUDENT IN AN ORGANIZED HEALTH CARE EDUCATION/TRAINING PROGRAM

## 2022-01-20 PROCEDURE — 2709999900 HC NON-CHARGEABLE SUPPLY: Performed by: STUDENT IN AN ORGANIZED HEALTH CARE EDUCATION/TRAINING PROGRAM

## 2022-01-20 PROCEDURE — 77030000032 HC CUF TRNQT ZIMM -B: Performed by: STUDENT IN AN ORGANIZED HEALTH CARE EDUCATION/TRAINING PROGRAM

## 2022-01-20 PROCEDURE — 74011000250 HC RX REV CODE- 250: Performed by: STUDENT IN AN ORGANIZED HEALTH CARE EDUCATION/TRAINING PROGRAM

## 2022-01-20 DEVICE — DBL LD DX FBR TK W/BLU, 0.9MM ST NDLS
Type: IMPLANTABLE DEVICE | Site: ANKLE | Status: FUNCTIONAL
Brand: ARTHREX®

## 2022-01-20 DEVICE — 2.7 X 20 MM R3CON NON-LOCKING PLATE SCREW
Type: IMPLANTABLE DEVICE | Site: ANKLE | Status: FUNCTIONAL
Brand: GORILLA PLATING SYSTEM

## 2022-01-20 DEVICE — 3.5 X 14 MM R3CON NON-LOCKING PLATE SCREW
Type: IMPLANTABLE DEVICE | Site: ANKLE | Status: FUNCTIONAL
Brand: GORILLA PLATING SYSTEM

## 2022-01-20 DEVICE — 3.5 X 16 MM R3CON NON-LOCKING PLATE SCREW
Type: IMPLANTABLE DEVICE | Site: ANKLE | Status: FUNCTIONAL
Brand: GORILLA PLATING SYSTEM

## 2022-01-20 DEVICE — TIGHTROPE XP BUTTRESS PLATE IMPLANT SYS
Type: IMPLANTABLE DEVICE | Site: ANKLE | Status: FUNCTIONAL
Brand: ARTHREX®

## 2022-01-20 RX ORDER — ONDANSETRON 4 MG/1
4 TABLET, ORALLY DISINTEGRATING ORAL
Qty: 10 TABLET | Refills: 0 | Status: SHIPPED | OUTPATIENT
Start: 2022-01-20 | End: 2022-02-25

## 2022-01-20 RX ORDER — HYDROMORPHONE HYDROCHLORIDE 2 MG/ML
INJECTION, SOLUTION INTRAMUSCULAR; INTRAVENOUS; SUBCUTANEOUS AS NEEDED
Status: DISCONTINUED | OUTPATIENT
Start: 2022-01-20 | End: 2022-01-20 | Stop reason: HOSPADM

## 2022-01-20 RX ORDER — DIPHENHYDRAMINE HYDROCHLORIDE 50 MG/ML
12.5 INJECTION, SOLUTION INTRAMUSCULAR; INTRAVENOUS AS NEEDED
Status: DISCONTINUED | OUTPATIENT
Start: 2022-01-20 | End: 2022-01-20 | Stop reason: HOSPADM

## 2022-01-20 RX ORDER — LIDOCAINE HYDROCHLORIDE 20 MG/ML
INJECTION, SOLUTION INFILTRATION; PERINEURAL
Status: DISCONTINUED
Start: 2022-01-20 | End: 2022-01-20 | Stop reason: HOSPADM

## 2022-01-20 RX ORDER — OXYCODONE HYDROCHLORIDE 5 MG/1
5 TABLET ORAL
Qty: 30 TABLET | Refills: 0 | Status: SHIPPED | OUTPATIENT
Start: 2022-01-20 | End: 2022-01-25

## 2022-01-20 RX ORDER — SODIUM CHLORIDE 0.9 % (FLUSH) 0.9 %
5-40 SYRINGE (ML) INJECTION EVERY 8 HOURS
Status: DISCONTINUED | OUTPATIENT
Start: 2022-01-20 | End: 2022-01-20 | Stop reason: HOSPADM

## 2022-01-20 RX ORDER — SODIUM CHLORIDE, SODIUM LACTATE, POTASSIUM CHLORIDE, CALCIUM CHLORIDE 600; 310; 30; 20 MG/100ML; MG/100ML; MG/100ML; MG/100ML
25 INJECTION, SOLUTION INTRAVENOUS CONTINUOUS
Status: DISCONTINUED | OUTPATIENT
Start: 2022-01-20 | End: 2022-01-20 | Stop reason: HOSPADM

## 2022-01-20 RX ORDER — FENTANYL CITRATE 50 UG/ML
INJECTION, SOLUTION INTRAMUSCULAR; INTRAVENOUS AS NEEDED
Status: DISCONTINUED | OUTPATIENT
Start: 2022-01-20 | End: 2022-01-20 | Stop reason: HOSPADM

## 2022-01-20 RX ORDER — MIDAZOLAM HYDROCHLORIDE 1 MG/ML
INJECTION, SOLUTION INTRAMUSCULAR; INTRAVENOUS
Status: COMPLETED
Start: 2022-01-20 | End: 2022-01-20

## 2022-01-20 RX ORDER — FENTANYL CITRATE 50 UG/ML
25 INJECTION, SOLUTION INTRAMUSCULAR; INTRAVENOUS
Status: DISCONTINUED | OUTPATIENT
Start: 2022-01-20 | End: 2022-01-20 | Stop reason: HOSPADM

## 2022-01-20 RX ORDER — FENTANYL CITRATE 50 UG/ML
INJECTION, SOLUTION INTRAMUSCULAR; INTRAVENOUS
Status: COMPLETED
Start: 2022-01-20 | End: 2022-01-20

## 2022-01-20 RX ORDER — PROPOFOL 10 MG/ML
INJECTION, EMULSION INTRAVENOUS AS NEEDED
Status: DISCONTINUED | OUTPATIENT
Start: 2022-01-20 | End: 2022-01-20 | Stop reason: HOSPADM

## 2022-01-20 RX ORDER — SODIUM CHLORIDE 9 MG/ML
INJECTION, SOLUTION INTRAVENOUS
Status: COMPLETED | OUTPATIENT
Start: 2022-01-20 | End: 2022-01-20

## 2022-01-20 RX ORDER — HYDROMORPHONE HYDROCHLORIDE 1 MG/ML
.2-.5 INJECTION, SOLUTION INTRAMUSCULAR; INTRAVENOUS; SUBCUTANEOUS ONCE
Status: DISCONTINUED | OUTPATIENT
Start: 2022-01-20 | End: 2022-01-20 | Stop reason: HOSPADM

## 2022-01-20 RX ORDER — MIDAZOLAM HYDROCHLORIDE 1 MG/ML
INJECTION, SOLUTION INTRAMUSCULAR; INTRAVENOUS
Status: DISCONTINUED
Start: 2022-01-20 | End: 2022-01-20 | Stop reason: HOSPADM

## 2022-01-20 RX ORDER — ONDANSETRON 2 MG/ML
INJECTION INTRAMUSCULAR; INTRAVENOUS AS NEEDED
Status: DISCONTINUED | OUTPATIENT
Start: 2022-01-20 | End: 2022-01-20 | Stop reason: HOSPADM

## 2022-01-20 RX ORDER — ROPIVACAINE HYDROCHLORIDE 5 MG/ML
INJECTION, SOLUTION EPIDURAL; INFILTRATION; PERINEURAL
Status: COMPLETED | OUTPATIENT
Start: 2022-01-20 | End: 2022-01-20

## 2022-01-20 RX ORDER — SODIUM CHLORIDE 0.9 % (FLUSH) 0.9 %
5-40 SYRINGE (ML) INJECTION AS NEEDED
Status: DISCONTINUED | OUTPATIENT
Start: 2022-01-20 | End: 2022-01-20 | Stop reason: HOSPADM

## 2022-01-20 RX ORDER — SUCCINYLCHOLINE CHLORIDE 20 MG/ML
INJECTION INTRAMUSCULAR; INTRAVENOUS AS NEEDED
Status: DISCONTINUED | OUTPATIENT
Start: 2022-01-20 | End: 2022-01-20 | Stop reason: HOSPADM

## 2022-01-20 RX ORDER — LIDOCAINE HYDROCHLORIDE 10 MG/ML
0.1 INJECTION, SOLUTION EPIDURAL; INFILTRATION; INTRACAUDAL; PERINEURAL AS NEEDED
Status: DISCONTINUED | OUTPATIENT
Start: 2022-01-20 | End: 2022-01-20 | Stop reason: HOSPADM

## 2022-01-20 RX ORDER — LIDOCAINE HYDROCHLORIDE 20 MG/ML
INJECTION, SOLUTION EPIDURAL; INFILTRATION; INTRACAUDAL; PERINEURAL AS NEEDED
Status: DISCONTINUED | OUTPATIENT
Start: 2022-01-20 | End: 2022-01-20 | Stop reason: HOSPADM

## 2022-01-20 RX ORDER — FENTANYL CITRATE 50 UG/ML
INJECTION, SOLUTION INTRAMUSCULAR; INTRAVENOUS
Status: DISCONTINUED
Start: 2022-01-20 | End: 2022-01-20 | Stop reason: HOSPADM

## 2022-01-20 RX ORDER — MORPHINE SULFATE 10 MG/ML
2 INJECTION, SOLUTION INTRAMUSCULAR; INTRAVENOUS
Status: DISCONTINUED | OUTPATIENT
Start: 2022-01-20 | End: 2022-01-20 | Stop reason: HOSPADM

## 2022-01-20 RX ORDER — ROPIVACAINE HYDROCHLORIDE 5 MG/ML
INJECTION, SOLUTION EPIDURAL; INFILTRATION; PERINEURAL
Status: COMPLETED
Start: 2022-01-20 | End: 2022-01-20

## 2022-01-20 RX ORDER — OXYCODONE AND ACETAMINOPHEN 5; 325 MG/1; MG/1
1 TABLET ORAL
Status: DISCONTINUED | OUTPATIENT
Start: 2022-01-20 | End: 2022-01-20 | Stop reason: HOSPADM

## 2022-01-20 RX ORDER — DROPERIDOL 2.5 MG/ML
0.62 INJECTION, SOLUTION INTRAMUSCULAR; INTRAVENOUS AS NEEDED
Status: DISCONTINUED | OUTPATIENT
Start: 2022-01-20 | End: 2022-01-20 | Stop reason: HOSPADM

## 2022-01-20 RX ORDER — MIDAZOLAM HYDROCHLORIDE 1 MG/ML
INJECTION, SOLUTION INTRAMUSCULAR; INTRAVENOUS AS NEEDED
Status: DISCONTINUED | OUTPATIENT
Start: 2022-01-20 | End: 2022-01-20 | Stop reason: HOSPADM

## 2022-01-20 RX ORDER — DEXAMETHASONE SODIUM PHOSPHATE 4 MG/ML
INJECTION, SOLUTION INTRA-ARTICULAR; INTRALESIONAL; INTRAMUSCULAR; INTRAVENOUS; SOFT TISSUE AS NEEDED
Status: DISCONTINUED | OUTPATIENT
Start: 2022-01-20 | End: 2022-01-20 | Stop reason: HOSPADM

## 2022-01-20 RX ADMIN — MEPERIDINE HYDROCHLORIDE 12.5 MG: 25 INJECTION, SOLUTION INTRAMUSCULAR; INTRAVENOUS; SUBCUTANEOUS at 13:14

## 2022-01-20 RX ADMIN — HYDROMORPHONE HYDROCHLORIDE 2 MG: 2 INJECTION, SOLUTION INTRAMUSCULAR; INTRAVENOUS; SUBCUTANEOUS at 12:35

## 2022-01-20 RX ADMIN — FENTANYL CITRATE 25 MCG: 50 INJECTION INTRAMUSCULAR; INTRAVENOUS at 13:41

## 2022-01-20 RX ADMIN — FENTANYL CITRATE 100 MCG: 50 INJECTION, SOLUTION INTRAMUSCULAR; INTRAVENOUS at 09:00

## 2022-01-20 RX ADMIN — ONDANSETRON HYDROCHLORIDE 4 MG: 2 INJECTION, SOLUTION INTRAMUSCULAR; INTRAVENOUS at 12:21

## 2022-01-20 RX ADMIN — LIDOCAINE HYDROCHLORIDE 100 MG: 20 INJECTION, SOLUTION INTRAVENOUS at 09:33

## 2022-01-20 RX ADMIN — WATER 3 G: 1 INJECTION INTRAMUSCULAR; INTRAVENOUS; SUBCUTANEOUS at 09:38

## 2022-01-20 RX ADMIN — FENTANYL CITRATE 50 MCG: 50 INJECTION, SOLUTION INTRAMUSCULAR; INTRAVENOUS at 11:40

## 2022-01-20 RX ADMIN — MIDAZOLAM HYDROCHLORIDE 6 MG: 1 INJECTION, SOLUTION INTRAMUSCULAR; INTRAVENOUS at 08:41

## 2022-01-20 RX ADMIN — SUCCINYLCHOLINE CHLORIDE 180 MG: 20 INJECTION, SOLUTION INTRAMUSCULAR; INTRAVENOUS at 09:33

## 2022-01-20 RX ADMIN — ROPIVACAINE HYDROCHLORIDE 10 ML: 5 INJECTION, SOLUTION EPIDURAL; INFILTRATION; PERINEURAL at 09:17

## 2022-01-20 RX ADMIN — FENTANYL CITRATE 100 MCG: 50 INJECTION, SOLUTION INTRAMUSCULAR; INTRAVENOUS at 08:41

## 2022-01-20 RX ADMIN — FENTANYL CITRATE 25 MCG: 50 INJECTION, SOLUTION INTRAMUSCULAR; INTRAVENOUS at 11:28

## 2022-01-20 RX ADMIN — SODIUM CHLORIDE, POTASSIUM CHLORIDE, SODIUM LACTATE AND CALCIUM CHLORIDE 25 ML/HR: 600; 310; 30; 20 INJECTION, SOLUTION INTRAVENOUS at 08:22

## 2022-01-20 RX ADMIN — FENTANYL CITRATE 25 MCG: 50 INJECTION INTRAMUSCULAR; INTRAVENOUS at 13:04

## 2022-01-20 RX ADMIN — DEXAMETHASONE SODIUM PHOSPHATE 8 MG: 4 INJECTION, SOLUTION INTRAMUSCULAR; INTRAVENOUS at 09:39

## 2022-01-20 RX ADMIN — FENTANYL CITRATE 25 MCG: 50 INJECTION, SOLUTION INTRAMUSCULAR; INTRAVENOUS at 11:19

## 2022-01-20 RX ADMIN — ROPIVACAINE HYDROCHLORIDE 40 ML: 5 INJECTION, SOLUTION EPIDURAL; INFILTRATION; PERINEURAL at 09:00

## 2022-01-20 RX ADMIN — PROPOFOL 200 MG: 10 INJECTION, EMULSION INTRAVENOUS at 09:33

## 2022-01-20 NOTE — ANESTHESIA PREPROCEDURE EVALUATION
Relevant Problems   RESPIRATORY SYSTEM   (+) CORTEZ (dyspnea on exertion)      ENDOCRINE   (+) Morbid obesity (HCC)       Anesthetic History   No history of anesthetic complications            Review of Systems / Medical History  Patient summary reviewed, nursing notes reviewed and pertinent labs reviewed    Pulmonary          Undiagnosed apnea      Comments: STOP BANG 5   Neuro/Psych         Psychiatric history     Cardiovascular  Within defined limits                Exercise tolerance: >4 METS  Comments: 04/21 EKG= SB    05/21 Stress ECHO= neg for ischemia, o/w inconclusive    Heart murmur   GI/Hepatic/Renal     GERD           Endo/Other        Morbid obesity and arthritis     Other Findings   Comments: Right ankle fracture         Physical Exam    Airway  Mallampati: I  TM Distance: > 6 cm  Neck ROM: normal range of motion   Mouth opening: Normal     Cardiovascular    Rhythm: regular  Rate: normal         Dental  No notable dental hx       Pulmonary  Breath sounds clear to auscultation               Abdominal  GI exam deferred       Other Findings            Anesthetic Plan    ASA: 3  Anesthesia type: general and regional - popliteal fossa block and saphenous block    Monitoring Plan: BIS  Post-op pain plan if not by surgeon: peripheral nerve block single    Induction: Intravenous  Anesthetic plan and risks discussed with: Patient

## 2022-01-20 NOTE — ANESTHESIA POSTPROCEDURE EVALUATION
Procedure(s):  RIGHT LATERAL MALLEOLUS OPEN REDUCTION INTERNAL FIXATION AND SYNDESMOSIS, AND DELTOID LIGAMENT REPAIR (POP & ADDUCTOR CANAL BLOCK (URGENT)  REMOVAL OF EXTERNAL FIXATOR. general, regional    Anesthesia Post Evaluation      Multimodal analgesia: multimodal analgesia used between 6 hours prior to anesthesia start to PACU discharge  Patient location during evaluation: PACU  Patient participation: complete - patient participated  Level of consciousness: awake and alert  Pain score: 4  Pain management: satisfactory to patient  Airway patency: patent  Anesthetic complications: no  Cardiovascular status: acceptable  Respiratory status: acceptable  Hydration status: acceptable  Comments: Pt has popliteal & adductor canal nerve blocks, but her size made placement of blocks difficult, especially the popliteal.  Having pain postop so blocks are spotty and inadequate. Treated with opioids. Pt has a fairly high tolerance to meds. .  Still non-weight bearing postop. Post anesthesia nausea and vomiting:  none  Final Post Anesthesia Temperature Assessment:  Normothermia (36.0-37.5 degrees C)      INITIAL Post-op Vital signs:   Vitals Value Taken Time   /85 01/20/22 1402   Temp 36.4 °C (97.5 °F) 01/20/22 1259   Pulse 75 01/20/22 1403   Resp 19 01/20/22 1403   SpO2 92 % 01/20/22 1403   Vitals shown include unvalidated device data.

## 2022-01-20 NOTE — PERIOP NOTES
Air Warming blanket placed on pt; turned on for comfort    Permission received to review discharge instructions and discuss private health information with   and will have someone with them after discharge         Dr. Puma Denise performed a RL Extremity   nerve block with the U/S machine and nerve stimulator  PT  was on cardiac monitoring, pulse oximetry and 3L NC O2.  Pt. Was given  6 mg of versed and 200 mcg of fentanyl  l IV for sedation. VSS. Pt. Was awake and alert post block.

## 2022-01-20 NOTE — ANESTHESIA PROCEDURE NOTES
Peripheral Block    Start time: 1/20/2022 9:10 AM  End time: 1/20/2022 9:20 AM  Performed by: Nikolay Dodd MD  Authorized by: Nikolay Dodd MD       Pre-procedure: Indications: at surgeon's request and post-op pain management    Preanesthetic Checklist: patient identified, risks and benefits discussed, site marked, timeout performed, anesthesia consent given and patient being monitored    Timeout Time: 08:40 EST          Block Type:   Block Type:   Adductor canal block  Laterality:  Left  Monitoring:  Standard ASA monitoring, responsive to questions and oxygen  Injection Technique:  Single shot  Procedures: ultrasound guided    Patient Position: supine  Prep: chlorhexidine    Location:  Mid thigh  Needle Type:  Stimuplex  Needle Gauge:  21 G  Needle Localization:  Ultrasound guidance  Medication Injected:  Ropivacaine (PF) (NAROPIN)(0.5%) 5 mg/mL injection, 10 mL  Med Admin Time: 1/20/2022 9:17 AM    Assessment:  Number of attempts:  1  Injection Assessment:  Incremental injection every 5 mL, no paresthesia, local visualized surrounding nerve on ultrasound, negative aspiration for blood and no intravascular symptoms  Patient tolerance:  Patient tolerated the procedure well with no immediate complications

## 2022-01-20 NOTE — ANESTHESIA PROCEDURE NOTES
Peripheral Block    Start time: 1/20/2022 8:38 AM  End time: 1/20/2022 9:05 AM  Performed by: Leatha Pelletier MD  Authorized by: Leatha Pelletier MD       Pre-procedure: Indications: at surgeon's request and post-op pain management    Preanesthetic Checklist: patient identified, risks and benefits discussed, site marked, timeout performed, anesthesia consent given and patient being monitored    Timeout Time: 08:40 EST          Block Type:   Block Type:  Popliteal  Laterality:  Left  Monitoring:  Standard ASA monitoring, responsive to questions and oxygen  Injection Technique:  Single shot  Procedures: ultrasound guided and nerve stimulator    Patient Position: prone  Prep: chlorhexidine    Location:  Mid thigh  Needle Type:  Stimuplex  Needle Gauge:  21 G  Needle Localization:  Nerve stimulator and ultrasound guidance  Motor Response comment:   Motor Response: minimal motor response >0.4 mA   Medication Injected:  Ropivacaine (PF) (NAROPIN)(0.5%) 5 mg/mL injection, 40 mL  Med Admin Time: 1/20/2022 9:00 AM    Assessment:  Number of attempts:  2  Injection Assessment:  Incremental injection every 5 mL, no paresthesia, ultrasound image on chart, local visualized surrounding nerve on ultrasound, negative aspiration for blood and no intravascular symptoms  Patient tolerance:  Patient tolerated the procedure well with no immediate complications  First attempt at block placement done with chlorhex prep, 21g Stimuplex needle and nerve stimulator, using lateral approach to plexus. Unable to illicit twitch after several passes. Aborted procedure. Moved pt lateral, then prone to be able to visualize nerves with US. Pt is morbidly obese with large thigh.

## 2022-01-20 NOTE — DISCHARGE INSTRUCTIONS
Weightbearing:  Nonweightbearing on your operative extremity. You may use crutches, a rolling knee scooter, or walker to help with ambulation. Dressings:    Leave your dressing/splint in place until your post-operative visit. Keep it clean and dry. Mild to moderate blood or drainage after surgery is expected. Blood Clot Medication:    You have been prescribed Aspirin to help reduce your risk of developing blood clots after surgery. You should start this the morning following your surgery. Pain Control: For pain control, you have been prescribed a narcotic pain medication. You may also take tylenol and NSAIDs such as ibuprofen  for pain control. You should wean from the narcotic medication as you are able. Do not drink alcohol or drive while using narcotic pain medication. You should also keep your foot elevated as much as possible. Avoid pressure under the heel by placing pillows under the calf, not your foot. Constipation:    While taking narcotics, you may have constipation. A stool softener is recommended. You may use over-the-counter stool softeners such as Miralax, Colace, and Senna. Nausea and Vomiting:    Sometimes after surgery, patients have nausea or vomiting. A medicine (Zofran) has been prescribed for this. If you do not have nausea or vomiting, then you do you not need to take this. General Considerations:  1. Keep your foot elevated as much as possible after surgery. It is ideal to have it positioned above your heart to allow swelling to subside. You can place it on several pillows or on the back of the couch. While eating, rest it on another chair at the dinner table. The worst swelling occurs the frst week or two after surgery, but can take much longer up to months to fully subside. 2. Placing ice packs on the outside of the bandage may help with pain and swelling. We recommend 20-30 minutes on and then 90 minutes off.   Also, do not place ice packs directly on the skin or on the toes themselves (which can impair circulation to the toes). 3. Keep your bandage or dressing dry. If it becomes wet, please contact our office. It will likely be necessary to have you come in to be evaluated and have it changed to a dry one. A wet, saturated dressing can lead to problems with stitches and wound healing. 4. Bathing or showering can be challenging. It is important to keep the dressing or cast dry, so covering it with a cast cover or garbage bag can help. It is important to avoid submerging the leg in case the cover leaks. Sitting in the tub or on a shower chair is also recommended, as it is not safe to balance or stand on one leg.  5. Once your sutures have been removed, it is helpful to massage your scar(s) two to three times daily. This can help moisturize the incision, decrease sensitivity, and break up scar tissue. We recommend you use common over-the-counter products such as cocoa butter, vitamin E oil, or Mederma. Rub along the scar as well across the scar side to side. 6. Do not drive until instructed by your surgeon. You should not drive while taking narcotic pain medications. Even if surgery was performed on your left foot, driving for long periods can lead to swelling and discomfort due to the foot being down. Once you are allowed to drive, try shorter trips to get accustomed to operating a car again. It may help to practice in an empty parking lot to get used to controlling the pedals. TAKE NARCOTIC PAIN MEDICATIONS WITH FOOD! For the night of surgery, while block is still in effect, start with 1 pain pill at bedtime    Narcotics tend to be constipating and we recommend taking a stool softener such as Colace or Miralax (follow package instructions). If you were given prescriptions, please review the written information on the prescribed medications. DO NOT DRIVE WHILE TAKING NARCOTIC PAIN MEDICATIONS.     CPAP PATIENTS BE SURE TO WEAR MACHINE WHENEVER NAPPING OR SLEEPING DAY/NIGHT OF SURGERY! DISCHARGE SUMMARY from Nurse    The following personal items collected during your admission are returned to you:   Dental Appliance: Dental Appliances: None  Vision: Visual Aid: Glasses  Hearing Aid:    Jewelry: Jewelry: None  Clothing: Clothing: With patient  Other Valuables: Other Valuables: None  Valuables sent to safe:        PATIENT INSTRUCTIONS:    After General Anesthesia or Intravenous Sedation, for 24 hours or while taking prescription Narcotics:  · Someone should be with you for the next 24 hours. · For your own safety, a responsible adult must drive you home. · Limit your activities  · Recommended activity: Rest today, up with assistance today. Do not climb stairs or shower unattended for the next 24 hours. · Start with a soft bland diet and advance as tolerated (no nausea) to regular diet. · If you have a sore throat some things that may help are: fluids, warm salt water gargle, or throat lozenges. If this does not improve after several days please follow up with your family physician. · Do not drive and operate hazardous machinery  · Do not make important personal or business decisions  · Do  not drink alcoholic beverages  · If you have not urinated within 8 hours after discharge, please contact your surgeon on call. Report the following to your surgeon:  · Excessive pain, swelling, redness or odor of or around the surgical area  · Temperature over 100.5  · Nausea and vomiting lasting longer than 4 hours or if unable to take medications  · Any signs of decreased circulation or nerve impairment to extremity: change in color, persistent  numbness, tingling, coldness or increase pain      · If you received a lower extremity nerve block, please use your crutches, walker, or cane until the nerve block has worn off, then refer to your surgeons post-operative instructions.     · You will receive a Post Operative Call from one of the Recovery Room Nurses on the day after your surgery to check on you. It is very important for us to know how you are recovering after your surgery. If you have an issue please call your surgeon, do not wait for the post operative call. · You may receive an e-mail or letter in the mail from [employer] regarding your experience with us in the Ambulatory Surgery Unit. Your feedback is valuable to us and we appreciate your participation in the survey. ·   · If the above instructions are not adequate or you are having problems after your surgery, call your physician at their office number. ·   · We wish youre a speedy recovery ? What to do at Home:    *  Please give a list of your current medications to your Primary Care Provider. *  Please update this list whenever your medications are discontinued, doses are      changed, or new medications (including over-the-counter products) are added. *  Please carry medication information at all times in case of emergency situations. If you have not had your influenza or pneumococcal vaccines, please follow up with your primary care physician. The discharge information has been reviewed with the patient and caregiver. The patient and caregiver verbalized understanding. TO PREVENT AN INFECTION      1. [address] YOUR HANDS     To prevent infection, good handwashing is the most important thing you or your caregiver can do.  Wash your hands with soap and water or use the hand  we gave you before you touch any wounds. 2. SHOWER     Use the antibacterial soap we gave you when you take a shower.  Shower with this soap until your wounds are healed.  To reach all areas of your body, you may need someone to help you.  Dont forget to clean your belly button with every shower. 3.  USE CLEAN SHEETS     Use freshly cleaned sheets on your bed after surgery.       To keep the surgery site clean, do not allow pets to sleep with you while your wound is still healing. 4. STOP SMOKING     Stop smoking, or at least cut back on smoking     Smoking slows your healing. 5.  CONTROL YOUR BLOOD SUGAR     High blood sugars slow wound healing. If you are diabetic, control your blood sugar levels before and after your surgery. [place] THROMBOSIS AND PULMONARY EMBOLUS    SURGICAL PATIENTS  Surgical patients are the #1 risk for DVT and PE. WHAT IS DVT? WHAT IS PE?  DVT is a serious condition where blood clots develop deep in the veins of the legs. PE occurs when a blood clot breaks loose from the wall of a vein and travels to the lungs blocking the pulmonary artery or one of its branches impairing blood flow from the heart, which could result in death.   RISK FACTORS  Surgery lasting longer than 45 minutes  History of inflammatory bowel disease  Oral contraceptive or hormone replacement therapy  Immobilization  Varicose veins / swollen legs  Smoking   CHF / Acute MI / Irregular heart beat  Family history of thrombosis  General anesthesia greater than 2 hours  Obesity  Infection of less than one month  Less than 1 month postpartum  COPD / Pneumonia  Arthroscopic surgery  Malignancy / cancer  Spine surgery  Blood abnormalities  Stroke / Paralysis / Coma    SIGNS AND SYMPTOMS OF DEEP VEIN TROMBOSIS   -  ER VISIT  Usually occurs in one leg, above or below the knee  Swelling - one calf or thigh may be larger than the other  Feeling increased warmth in the area of the leg that is swollen or painful  Leg pain, which may increase when standing or walking  Swelling along the vein of the leg  When swollen areas is pressed with a finger, a depression may remain  Tenderness of the leg that may be confined to one area  Change in leg color (bluish or red)    SIGNS AND SYMPTOMS OF PULMONARY EMBOLUS  - 911 CALL  Chest pain that gets worse with deep breath, coughing or chest movement  Coughing up blood  Sweating  Shortness of breath or difficulty breathing  Rapid heart beat  Lightheadedness    HOW TO REDUCE THE POSSIBLE RISK OF DVT  Exercise - simple activities as rotating ankles and wrists, wiggling toes and fingers, tightening and relaxing muscles in calves and thighs promotes circulation while recovering from surgery. Please do these exercises every hour during waking hours  Take mediation as prescribed by your physician (Lovenox, Coumadin, Aspirin)  Resume your normal activities as soon as your doctor advises you to do so. Remember, when traveling, to [place] your legs frequently.       PATIENTS WHO BELIEVE THEY MAY BE EXPERIENCING SIGNS AND SYMPTOMS OF DVT OR PE SHOULD SEEK MEDICAL HELP IMMEDIATELY        

## 2022-01-20 NOTE — PERIOP NOTES
[address] [facility]  1985  [id number]    Situation:  Verbal report given from: Abilio ABRAMS  Procedure: Procedure(s):  RIGHT LATERAL MALLEOLUS OPEN REDUCTION INTERNAL FIXATION AND SYNDESMOSIS, AND DELTOID LIGAMENT REPAIR (POP & ADDUCTOR CANAL BLOCK (URGENT)  REMOVAL OF EXTERNAL FIXATOR    Background:    Preoperative diagnosis: RIGHT ANKLE FRACTURE    Postoperative diagnosis: RIGHT ANKLE FRACTURE    :  Dr. Dereje Bourne    Assistant(s): Circ-1: Jose Mora  Circ-Relief: Rebecca Anderson RN  Scrub Tech-1: RT Yue  Surg Asst-1: Amarilis Zacarias  Surg Asst-Relief: Maggy Arias RN    Specimens: * No specimens in log *    Assessment:  Intra-procedure medications         Anesthesia gave intra-procedure sedation and medications, see anesthesia flow sheet     Intravenous fluids: LR@ KVO     Vital signs stable       Recommendation:    Permission to share finding with family : yes

## 2022-01-20 NOTE — PERIOP NOTES
1245 Pt very drowsy. Dressing on right foot CDI. Right toes warm and pink. Right foot elevated on 2 pillows. 1300 Pt waking up. C/o severe pain 9/10.    1305 Fentanyl 25mcg IV given. 1315 Pain continues to be 9/10. Demerol 12.5mg IV gven. Dr. Ollie Riggins spoke with pt at the bedside. 1320 Pt states pain is now a 7/10.   1400 Pt states pain is a 4/10. Its tolerable. VSS>  RIght toes warm and pink. Dressing CDI. Pt meets discharge criteria.

## 2022-01-21 NOTE — OP NOTES
OPERATIVE REPORT     PATIENT:  [place]                                        MRN:  [id number]  :  1985  ADMITTING PHYSICIAN:  French Zavala MD  ______________________________________________________________________     DATE OF SURGERY:  2022  SURGEON:  French Zavala MD        ASSISTANT:       PREOPERATIVE DIAGNOSIS(ES):  Right ankle fracture dislocation status post external fixator placement  Right displaced lateral malleolus fracture  Right syndesmosis disruption  Right deltoid ligament tear  Morbid Obesity      POSTOPERATIVE DIAGNOSIS(ES):  Right ankle fracture dislocation status post external fixator placement  Right displaced lateral malleolus fracture  Right syndesmosis disruption  Right deltoid ligament tear  Morbid Obesity      PROCEDURE(S) PERFORMED:  Right ORIF lateral malleolus  Right ORIF syndesmosis  Right deltoid ligament repair  Right external fixator removal     FINDINGS:    ANESTHESIA:   General.     TOURNIQUET TIME:    Total Tourniquet Time Documented:  Thigh (Right) - 115 minutes  Total: Thigh (Right) - 115 minutes       IMPLANTS:    Implant Name Type Inv. Item Serial No.  Lot No. LRB No. Used Action   SCREW BNE L20MM DIA2.7MM NONLOCKING FOR [employer] - SNA  SCREW BNE L20MM DIA2.7MM NONLOCKING FOR [employer] NA PARAGON 28_WD NA Right 1 Implanted   SCREW BNE L16MM DIA3. 5MM R3CON CEE PLT [place]OR[place]A PLATING SYS - SNA  SCREW BNE L16MM DIA3. 5MM R3CON CEE PLT [place] PLATING SYS NA PARAGON 28_WD NA Right 3 Implanted   SCREW BNE L14MM DIA3.5MM NONLOCKING FOR R3CON PLATING SYS - SNA  SCREW BNE L14MM DIA3.5MM NONLOCKING FOR R3CON PLATING SYS NA PARAGON 28_WD NA Right 3 Implanted   10 hole straight plate Plate  NA PARAGON 28 NA Right 1 Implanted   Syndemosis TightRope XP Buttress Plate Implant System   NA [employer]X [id number] Right 1 Implanted   FiberTak DX Suture Dorris, Double-Loaded Dorris  NA ARTHREX [id number] Right 1 Implanted          INDICATIONS FOR THE PROCEDURE:  32-year-old female who sustained an ankle fracture dislocation after slipping on the stairs. At her initial visit with me, she was noted to be fairly swollen and had a fair amount of lateral displacement of the talus. We were unable to maintain the reduction with closed reduction of maneuvers. She was subsequently taken for an external fixator placement with plans for later definitive fixation. At her last follow-up visit with me, her swelling had subsided such that and felt that it was safe to proceed with definitive fixation. We discussed the risks and benefits the of surgery and she elected to proceed. DESCRIPTION OF PROCEDURE:    The correct patient, extremity, and operation were all identified in the preoperative holding area. I marked the operative extremity. A block was administered by the anesthesia team and the patient was taken back to the operating room and placed supine on the operating room table. General anesthesia was then induced. All bony prominences were well-padded. A tourniquet was applied to the operative extremity. A preoperative time-out was called and again the correct patient operation and extremity were all identified, preoperative antibiotics were given IV within 30 minutes of the incision, all parties were in agreement and we proceeded with the operation. We 1st proceeded with the external fixator removed and portion of the procedure. The clamps were loosened using a T-handle wrench. The clamps and bars were then removed from the pins. The pins were then manually removed using the T-handle. A large  was used to cut the calcaneal pin from 1 side. The remaining end of the pin that was still outside of the skin was cleansed with Betadine. The calcaneal pin was then removed. The operative extremity was then prepped and draped in the usual sterile fashion.   A preoperative time-out was again called and again the correct patient operation and extremity were all identified, preoperative antibiotics were given IV within 30 minutes of the incision, all parties were in agreement and we proceeded with the operation. A lateral approach to the distal fibula was made. Sharp dissection performed through the skin and superficial tissues. Scissor dissection was carried out a layered fashion to avoid injury to the superficial peroneal nerve. Once down to the level of the distal fibula, the periosteum and soft tissue was elevated both anterior and posterior. This exposed the fracture site. We also exposed the syndesmosis. She was noted to have a rupture of the AITFL. The fracture site was debrided of hematoma and mobilized. Plan reduction clamps were used to reduce the fibula. We were having some difficulty reducing the fibula. On intraoperative fluoroscopic images, she was noted to have a very wide medial clear space. I felt that there is likely a deltoid ligament that was entrapped within the medial gutter blocking some of her reduction. Therefore, I proceeded with medial incision over the medial malleolus. Sharp dissection performed through the skin and superficial tissues. Scissor dissection was carried out layered fashion down to the level of the medial malleolus. Care was taken to avoid injury of the saphenous nerve and vein. We identified the deltoid ligament, which is noted to be ruptured. The deltoid fibers were position within the medial gutter. There was additional scar tissue and hematoma within the medial gutter as well. This was gently debrided. We then turned our attention back to the lateral malleolus. After debriding the medial gutter we then turned our attention back to the lateral malleolus. Pointed reduction clamps were used to help reduce and temporarily stabilize the fracture. Orthogonal fluoroscopic views were obtained and a satisfied with the reduction.   We then elected to place a a 2.7 mm lag screw in a lag by technique fashion. This achieved excellent compression and purchase across the fracture site. Eleven hole paragon straight plate was then selected and positioned along the lateral border of the fibula. We temporarily held this in place with 2 olive wires. We confirmed the position of the plate on orthogonal fluoroscopic views and were satisfied. We then secured the plate distally with 2 nonlocking screws and 4 nonlocking screws proximally. Orthogonal fluoroscopic views were obtained in a satisfied with the length, alignment, and rotation of the fibula as well as the position of the hardware. Both clinically and radiographically, the syndesmosis was still unstable. Therefore, we elected to proceed with open reduction internal fixation of the syndesmosis. The syndesmosis was previously debrided of hematoma. We then manually held the syndesmosis in a reduced position. We confirmed the reduction on orthogonal fluoroscopic views as well as on visual inspection. The guidewire for the Arthrex tight rope was then placed. This was done under fluoroscopic guidance. We confirmed the position of the guidewire. This was then overdrilled with a cannulated drill bit and the tight rope was inserted. The button was flipped. We ensured that the button was seated on the medial aspect of the tibia and that no soft tissue was interposed. The sutures on the tightrope were then sequentially tightened to secure the tight rope in place. We confirmed the reduction on orthogonal fluoroscopic views. I was satisfied with the reduction and stability. However, given the amount of initial displacement and the difficulty maintaining reduction, I did elect to place a 2nd tight rope. This was done in a similar manner as the 1st under fluoroscopic guidance.     Following open reduction internal fixation of the syndesmosis, I would then performed an external rotation stress test.  On fluoroscopy, she was still noted to have a significant amount of increased medial clear space widening. Given the amount of the injury to the deltoid ligament that was noted on the a dissection medially, I elected to repair the deltoid ligament. A FiberTak suture anchor was placed in the medial malleolus. The FiberTape sutures were then passed through the deltoid ligament that was still attached to its insertion on the talus. We then position the ankle and dorsiflexion and inversion and tied the sutures. Following this, her ankle felt more stable to external rotation stress and valgus stress testing. We again obtained and external rotation stress view and did not note any medial clear space widening. Final fluoroscopic images were taken and independently interpreted by me to show satisfactory reduction of the length, alignment, and rotation of the fibula as well as hardware placement and improved stability of the syndesmosis and medial ankle. The incisions were then copiously irrigated with sterile saline. The deep fascial layers were closed with 2-0 Vicryl. The subcuticular layers were closed with 3-0 Monocryl the skin was closed with 3-0 nylon. A sterile dressing was then applied followed by short-leg splint. She was then awoken from general anesthesia and transferred to PACU in stable condition. COMPLICATIONS:  None. POSTOPERATIVE PLAN:  She will be nonweightbearing on her right lower extremity. She will be on aspirin for DVT prophylaxis. We will plan to see her back in approximately 10-14 days for repeat evaluation and likely suture removal.  She should obtain nonweightbearing right ankle radiographs at that visit.

## 2022-02-10 ENCOUNTER — OFFICE VISIT (OUTPATIENT)
Dept: SLEEP MEDICINE | Age: 37
End: 2022-02-10
Payer: COMMERCIAL

## 2022-02-10 VITALS
WEIGHT: 290 LBS | SYSTOLIC BLOOD PRESSURE: 118 MMHG | DIASTOLIC BLOOD PRESSURE: 80 MMHG | HEART RATE: 70 BPM | TEMPERATURE: 98.1 F | HEIGHT: 65 IN | OXYGEN SATURATION: 99 % | BODY MASS INDEX: 48.32 KG/M2

## 2022-02-10 DIAGNOSIS — Z86.59 H/O: DEPRESSION: ICD-10-CM

## 2022-02-10 DIAGNOSIS — G47.33 OSA (OBSTRUCTIVE SLEEP APNEA): Primary | ICD-10-CM

## 2022-02-10 PROCEDURE — 99204 OFFICE O/P NEW MOD 45 MIN: CPT | Performed by: INTERNAL MEDICINE

## 2022-02-10 RX ORDER — ARIPIPRAZOLE 2 MG/1
2 TABLET ORAL DAILY
COMMUNITY
Start: 2022-01-20
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
## 2022-02-10 NOTE — PATIENT INSTRUCTIONS
217 [facility]., [place]. [place], [address]  Tel.  [phone]  Fax. 100 [facility] 60  [place], [address] [facility]  Tel.  [phone]  Fax. [phone] [address]  Tel.  [phone]  Fax. [phone]     Sleep Apnea: After Your Visit  Your Care Instructions  Sleep apnea occurs when you frequently stop breathing for 10 seconds or longer during sleep. It can be mild to severe, based on the number of times per hour that you stop breathing or have slowed breathing. Blocked or narrowed airways in your nose, mouth, or throat can cause sleep apnea. Your airway can become blocked when your throat muscles and tongue relax during sleep. Sleep apnea is common, occurring in 1 out of 20 individuals. Individuals having any of the following characteristics should be evaluated and treated right away due to high risk and detrimental consequences from untreated sleep apnea:  1. Obesity  2. Congestive Heart failure  3. Atrial Fibrillation  4. Uncontrolled Hypertension  5. Type II Diabetes  6. Night-time Arrhythmias  7. Stroke  8. Pulmonary Hypertension  9. High-risk Driving Populations (pilots, truck drivers, etc.)  10. Patients Considering Weight-loss Surgery    How do you know you have sleep apnea? You probably have sleep apnea if you answer 'yes' to 3 or more of the following questions:  S - Have you been told that you Snore? T - Are you often Tired during the day? O - Has anyone Observed you stop breathing while sleeping? P- Do you have (or are being treated for) high blood Pressure? B - Are you obese (Body Mass Index > 35)? A - Is your Age 48years old or older? N - Is your Neck size greater than 16 inches? G - Are you male Gender? A sleep physician can prescribe a breathing device that prevents tissues in the throat from blocking your airway.  Or your doctor may recommend using a dental device (oral breathing device) to help keep your airway open. In some cases, surgery may be needed to remove enlarged tissues in the throat. Follow-up care is a key part of your treatment and safety. Be sure to make and go to all appointments, and call your doctor if you are having problems. It's also a good idea to know your test results and keep a list of the medicines you take. How can you care for yourself at home? · Lose weight, if needed. It may reduce the number of times you stop breathing or have slowed breathing. · Go to bed at the same time every night. · Sleep on your side. It may stop mild apnea. If you tend to roll onto your back, sew a pocket in the back of your pajama top. Put a tennis ball into the pocket, and stitch the pocket shut. This will help keep you from sleeping on your back. · Avoid alcohol and medicines such as sleeping pills and sedatives before bed. · Do not smoke. Smoking can make sleep apnea worse. If you need help quitting, talk to your doctor about stop-smoking programs and medicines. These can increase your chances of quitting for good. · Prop up the head of your bed 4 to 6 inches by putting bricks under the legs of the bed. · Treat breathing problems, such as a stuffy nose, caused by a cold or allergies. · Use a continuous positive airway pressure (CPAP) breathing machine if lifestyle changes do not help your apnea and your doctor recommends it. The machine keeps your airway from closing when you sleep. · If CPAP does not help you, ask your doctor whether you should try other breathing machines. A bilevel positive airway pressure machine has two types of air pressureâone for breathing in and one for breathing out. Another device raises or lowers air pressure as needed while you breathe. · If your nose feels dry or bleeds when using one of these machines, talk with your doctor about increasing moisture in the air. A humidifier may help.   · If your nose is runny or stuffy from using a breathing machine, talk with your doctor about using decongestants or a corticosteroid nasal spray. When should you call for help? Watch closely for changes in your health, and be sure to contact your doctor if:  · You still have sleep apnea even though you have made lifestyle changes. · You are thinking of trying a device such as CPAP. · You are having problems using a CPAP or similar machine. Where can you learn more? Go to [place]. Enter T714 in the search box to learn more about \"Sleep Apnea: After Your Visit. \"   © 1559-6833 Healthwise, Incorporated. Care instructions adapted under license by [facility] [employer] (which disclaims liability or warranty for this information). This care instruction is for use with your licensed healthcare professional. If you have questions about a medical condition or this instruction, always ask your healthcare professional. Mardene Amass any warranty or liability for your use of this information. PROPER SLEEP HYGIENE    What to avoid  · Do not have drinks with caffeine, such as coffee or black tea, for 8 hours before bed. · Do not smoke or use other types of tobacco near bedtime. Nicotine is a stimulant and can keep you awake. · Avoid drinking alcohol late in the evening, because it can cause you to wake in the middle of the night. · Do not eat a big meal close to bedtime. If you are hungry, eat a light snack. · Do not drink a lot of water close to bedtime, because the need to urinate may wake you up during the night. · Do not read or watch TV in bed. Use the bed only for sleeping and sexual activity. What to try  · Go to bed at the same time every night, and wake up at the same time every morning. Do not take naps during the day. · Keep your bedroom quiet, dark, and cool. · Get regular exercise, but not within 3 to 4 hours of your bedtime. .  · Sleep on a comfortable pillow and mattress.   · If watching the clock makes you anxious, turn it facing away from you so you cannot see the time. · If you worry when you lie down, start a worry book. Well before bedtime, write down your worries, and then set the book and your concerns aside. · Try meditation or other relaxation techniques before you go to bed. · If you cannot fall asleep, get up and go to another room until you feel sleepy. Do something relaxing. Repeat your bedtime routine before you go to bed again. · Make your house quiet and calm about an hour before bedtime. Turn down the lights, turn off the TV, log off the computer, and turn down the volume on music. This can help you relax after a busy day. Drowsy Driving  The [address] [place] [address] Road Traffic Safety Administration cites drowsiness as a causing factor in more than 067,957 police reported crashes annually, resulting in 76,000 injuries and 1,500 deaths. Other surveys suggest 55% of people polled have driven while drowsy in the past year, 23% had fallen asleep but not crashed, 3% crashed, and 2% had and accident due to drowsy driving. Who is at risk? Young Drivers: One study of drowsy driving accidents states that 55% of the drivers were under 25 years. Of those, 75% were male. Shift Workers and Travelers: People who work overnight or travel across time zones frequently are at higher risk of experiencing Circadian Rhythm Disorders. They are trying to work and function when their body is programed to sleep. Sleep Deprived: Lack of sleep has a serious impact on your ability to pay attention or focus on a task. Consistently getting less than the average of 8 hours your body needs creates partial or cumulative sleep deprivation. Untreated Sleep Disorders: Sleep Apnea, Narcolepsy, R.L.S., and other sleep disorders (untreated) prevent a person from getting enough restful sleep. This leads to excessive daytime sleepiness and increases the risk for drowsy driving accidents by up to 7 times.   Medications / Alcohol: Even over the counter medications can cause drowsiness. Medications that impair a drivers attention should have a warning label. Alcohol naturally makes you sleepy and on its own can cause accidents. Combined with excessive drowsiness its effects are amplified. Signs of Drowsy Driving:   * You don't remember driving the last few miles   * You may drift out of your allen   * You are unable to focus and your thoughts wander   * You may yawn more often than normal   * You have difficulty keeping your eyes open / nodding off   * Missing traffic signs, speeding, or tailgating  Prevention-   Good sleep hygiene, lifestyle and behavioral choices have the most impact on drowsy driving. There is no substitute for sleep and the average person requires 8 hours nightly. If you find yourself driving drowsy, stop and sleep. Consider the sleep hygiene tips provided during your visit as well. Medication Refill Policy: Refills for all medications require 1 week advance notice. Please have your pharmacy fax a refill request. We are unable to fax, or call in \"controled substance\" medications and you will need to pick these prescriptions up from our office. Onit Activation    Thank you for requesting access to Onit. Please follow the instructions below to securely access and download your online medical record. Onit allows you to send messages to your doctor, view your test results, renew your prescriptions, schedule appointments, and more. How Do I Sign Up? 1. In your internet browser, go to [email or web address]. 2. Click on the First Time User? Click Here link in the Sign In box. You will see the New Member Sign Up page. 3. Enter your Onit Access Code exactly as it appears below. You will not need to use this code after youve completed the sign-up process. If you do not sign up before the expiration date, you must request a new code. Onit Access Code:  Activation code not generated  Current Onit Status: Active (This is the date your XM Radio access code will )    4. Enter the last four digits of your Social Security Number (xxxx) and Date of Birth (mm/dd/yyyy) as indicated and click Submit. You will be taken to the next sign-up page. 5. Create a Unideskt ID. This will be your XM Radio login ID and cannot be changed, so think of one that is secure and easy to remember. 6. Create a XM Radio password. You can change your password at any time. 7. Enter your Password Reset Question and Answer. This can be used at a later time if you forget your password. 8. Enter your e-mail address. You will receive e-mail notification when new information is available in [address] 9. Click Sign Up. You can now view and download portions of your medical record. 10. Click the Download Summary menu link to download a portable copy of your medical information. Additional Information    If you have questions, please call [phone]. Remember, X[employer] Radio is NOT to be used for urgent needs. For medical emergencies, dial 911.

## 2022-02-17 NOTE — PERIOP NOTES
[facility]  Preoperative Instructions        Surgery Date 2/25/2022   Time of Arrival To Be Called  Contact# [phone]    1. On the day of your surgery, please report to the Surgical Services Registration Desk and sign in at your designated time. The Surgery Center is located to the right of the Emergency Room. 2. You must have someone with you to drive you home. You should not drive a car for 24 hours following surgery. Please make arrangements for a friend or family member to stay with you for the first 24 hours after your surgery. 3. Do not have anything to eat or drink (including water, gum, mints, coffee, juice) after midnight  2/24/2022 . ? This may not apply to medications prescribed by your physician. ?(Please note below the special instructions with medications to take the morning of your procedure.)    4. We recommend you do not drink any alcoholic beverages for 24 hours before and after your surgery. 5. Contact your surgeons office for instructions on the following medications: non-steroidal anti-inflammatory drugs (i.e. Advil, Aleve), vitamins, and supplements. (Some surgeons will want you to stop these medications prior to surgery and others may allow you to take them)  **If you are currently taking Plavix, Coumadin, Aspirin and/or other blood-thinning agents, contact your surgeon for instructions. ** Your surgeon will partner with the physician prescribing these medications to determine if it is safe to stop or if you need to continue taking. Please do not stop taking these medications without instructions from your surgeon    6. Wear comfortable clothes. Wear glasses instead of contacts. Do not bring any money or jewelry. Please bring picture ID, insurance card, and any prearranged co-payment or hospital payment. Do not wear make-up, particularly mascara the morning of your surgery. Do not wear nail polish, particularly if you are having foot /hand surgery. Wear your hair loose or down, no ponytails, buns, patricia pins or clips. All body piercings must be removed. Please shower with antibacterial soap for three consecutive days before and on the morning of surgery, but do not apply any lotions, powders or deodorants after the shower on the day of surgery. Please use a fresh towels after each shower. Please sleep in clean clothes and change bed linens the night before surgery. Please do not shave for 48 hours prior to surgery. Shaving of the face is acceptable. 7. You should understand that if you do not follow these instructions your surgery may be cancelled. If your physical condition changes (I.e. fever, cold or flu) please contact your surgeon as soon as possible. 8. It is important that you be on time. If a situation occurs where you may be late, please call [phone] (OR Holding Area). 9. If you have any questions and or problems, please call [phone] (Pre-admission Testing). 10. Your surgery time may be subject to change. You will receive a phone call the evening prior if your time changes. 11.  If having outpatient surgery, you must have someone to drive you here, stay with you during the duration of your stay, and to drive you home at time of discharge. 12.  Due to current COVID restrictions, only ONE adult may accompany you the day of the procedure. We have limited seating available. If our waiting room is at capacity, your ride may be asked to remain in their vehicle. No children are allowed in the waiting room    Special Instructions: Follow all instructions given to you by your doctor. Covid Testing  2/21/2022 arrive between 7am - 11:45am - [address] [facility] side - [address] [place] [address]. Follow the signs to the [facility] Covid Testing drive up under the office overhang. (used to be [facility]) We recommend you self quarantine from time of testing til day of surgery.     TAKE ALL MEDICATIONS DAY OF SURGERY EXCEPT: vitamin or supplements    ASA - Hold per doctors orders       I understand a pre-operative phone call will be made to verify my surgery time. In the event that I am not available, I give permission for a message to be left on my answering service and/or with another person?   yes         ___________________      __________   _________    (Signature of Patient)             (Witness)                (Date and Time)

## 2022-02-21 ENCOUNTER — HOSPITAL ENCOUNTER (OUTPATIENT)
Dept: PREADMISSION TESTING | Age: 37
Discharge: HOME | End: 2022-02-21
Payer: COMMERCIAL

## 2022-02-21 LAB
SARS-COV-2, XPLCVT: NOT DETECTED
SOURCE, COVRS: NORMAL

## 2022-02-21 PROCEDURE — U0005 INFEC AGEN DETEC AMPLI PROBE: HCPCS

## 2022-02-22 ENCOUNTER — OFFICE VISIT (OUTPATIENT)
Dept: SLEEP MEDICINE | Age: 37
End: 2022-02-22

## 2022-02-22 ENCOUNTER — HOSPITAL ENCOUNTER (OUTPATIENT)
Dept: SLEEP MEDICINE | Age: 37
Discharge: HOME | End: 2022-02-22
Payer: COMMERCIAL

## 2022-02-22 DIAGNOSIS — R06.09 DOE (DYSPNEA ON EXERTION): ICD-10-CM

## 2022-02-22 DIAGNOSIS — G47.33 OSA (OBSTRUCTIVE SLEEP APNEA): Primary | ICD-10-CM

## 2022-02-22 DIAGNOSIS — R07.9 CHEST PAIN AT REST: ICD-10-CM

## 2022-02-22 DIAGNOSIS — E66.01 MORBID OBESITY (HCC): ICD-10-CM

## 2022-02-22 PROCEDURE — 95806 SLEEP STUDY UNATT&RESP EFFT: CPT | Performed by: INTERNAL MEDICINE

## 2022-02-22 NOTE — PROGRESS NOTES
S>Ginger Myers is a 29 y.o. female to receive a home sleep testing unit (HST). Patient's  is picking up device. · Patien's  was educated on proper hookup and operation of the HST via detailed instruction sheet (per COVID-19 precautions)  · Instruction forms with after hours contact and documentation were signed. O>    Visit Vitals  LMP 01/25/2022         A>  No diagnosis found. P>  · General information regarding operations and maintenance of the device was provided. · Follow-up appointment was made to return the HST. She will be contacted once the results have been reviewed.  was asked to contact our office for any problems regarding her home sleep test study.

## 2022-02-25 ENCOUNTER — HOSPITAL ENCOUNTER (OUTPATIENT)
Age: 37
Setting detail: OUTPATIENT SURGERY
Discharge: HOME | End: 2022-02-25
Attending: STUDENT IN AN ORGANIZED HEALTH CARE EDUCATION/TRAINING PROGRAM | Admitting: STUDENT IN AN ORGANIZED HEALTH CARE EDUCATION/TRAINING PROGRAM
Payer: COMMERCIAL

## 2022-02-25 ENCOUNTER — ANESTHESIA EVENT (OUTPATIENT)
Dept: SURGERY | Age: 37
End: 2022-02-25
Payer: COMMERCIAL

## 2022-02-25 ENCOUNTER — ANESTHESIA (OUTPATIENT)
Dept: SURGERY | Age: 37
End: 2022-02-25
Payer: COMMERCIAL

## 2022-02-25 VITALS
RESPIRATION RATE: 14 BRPM | OXYGEN SATURATION: 96 % | SYSTOLIC BLOOD PRESSURE: 133 MMHG | TEMPERATURE: 98.1 F | HEIGHT: 65 IN | DIASTOLIC BLOOD PRESSURE: 75 MMHG | WEIGHT: 293 LBS | HEART RATE: 80 BPM | BODY MASS INDEX: 48.82 KG/M2

## 2022-02-25 DIAGNOSIS — S82.891S CLOSED FRACTURE OF RIGHT ANKLE, SEQUELA: Primary | ICD-10-CM

## 2022-02-25 LAB — HCG UR QL: NEGATIVE

## 2022-02-25 PROCEDURE — 77030031139 HC SUT VCRL2 J&J -A: Performed by: STUDENT IN AN ORGANIZED HEALTH CARE EDUCATION/TRAINING PROGRAM

## 2022-02-25 PROCEDURE — 74011250637 HC RX REV CODE- 250/637

## 2022-02-25 PROCEDURE — 77030000032 HC CUF TRNQT ZIMM -B: Performed by: STUDENT IN AN ORGANIZED HEALTH CARE EDUCATION/TRAINING PROGRAM

## 2022-02-25 PROCEDURE — 74011250637 HC RX REV CODE- 250/637: Performed by: STUDENT IN AN ORGANIZED HEALTH CARE EDUCATION/TRAINING PROGRAM

## 2022-02-25 PROCEDURE — 2709999900 HC NON-CHARGEABLE SUPPLY

## 2022-02-25 PROCEDURE — 77030040922 HC BLNKT HYPOTHRM STRY -A

## 2022-02-25 PROCEDURE — C1713 ANCHOR/SCREW BN/BN,TIS/BN: HCPCS | Performed by: STUDENT IN AN ORGANIZED HEALTH CARE EDUCATION/TRAINING PROGRAM

## 2022-02-25 PROCEDURE — 74011250636 HC RX REV CODE- 250/636: Performed by: ANESTHESIOLOGY

## 2022-02-25 PROCEDURE — 77030039001 HC BIT DRL PG28 -C: Performed by: STUDENT IN AN ORGANIZED HEALTH CARE EDUCATION/TRAINING PROGRAM

## 2022-02-25 PROCEDURE — 76210000016 HC OR PH I REC 1 TO 1.5 HR: Performed by: STUDENT IN AN ORGANIZED HEALTH CARE EDUCATION/TRAINING PROGRAM

## 2022-02-25 PROCEDURE — 74011000258 HC RX REV CODE- 258: Performed by: STUDENT IN AN ORGANIZED HEALTH CARE EDUCATION/TRAINING PROGRAM

## 2022-02-25 PROCEDURE — 77030008684 HC TU ET CUF COVD -B: Performed by: ANESTHESIOLOGY

## 2022-02-25 PROCEDURE — 76010000131 HC OR TIME 2 TO 2.5 HR: Performed by: STUDENT IN AN ORGANIZED HEALTH CARE EDUCATION/TRAINING PROGRAM

## 2022-02-25 PROCEDURE — 81025 URINE PREGNANCY TEST: CPT

## 2022-02-25 PROCEDURE — 76060000035 HC ANESTHESIA 2 TO 2.5 HR: Performed by: STUDENT IN AN ORGANIZED HEALTH CARE EDUCATION/TRAINING PROGRAM

## 2022-02-25 PROCEDURE — 74011250636 HC RX REV CODE- 250/636: Performed by: STUDENT IN AN ORGANIZED HEALTH CARE EDUCATION/TRAINING PROGRAM

## 2022-02-25 PROCEDURE — 77030002916 HC SUT ETHLN J&J -A: Performed by: STUDENT IN AN ORGANIZED HEALTH CARE EDUCATION/TRAINING PROGRAM

## 2022-02-25 PROCEDURE — 77030002933 HC SUT MCRYL J&J -A: Performed by: STUDENT IN AN ORGANIZED HEALTH CARE EDUCATION/TRAINING PROGRAM

## 2022-02-25 PROCEDURE — 76210000021 HC REC RM PH II 0.5 TO 1 HR: Performed by: STUDENT IN AN ORGANIZED HEALTH CARE EDUCATION/TRAINING PROGRAM

## 2022-02-25 PROCEDURE — 74011250636 HC RX REV CODE- 250/636: Performed by: NURSE ANESTHETIST, CERTIFIED REGISTERED

## 2022-02-25 PROCEDURE — 2709999900 HC NON-CHARGEABLE SUPPLY: Performed by: STUDENT IN AN ORGANIZED HEALTH CARE EDUCATION/TRAINING PROGRAM

## 2022-02-25 PROCEDURE — 74011000250 HC RX REV CODE- 250: Performed by: NURSE ANESTHETIST, CERTIFIED REGISTERED

## 2022-02-25 DEVICE — 4.2 X 50 MM R3CON NON-LOCKING PLATE SCREW
Type: IMPLANTABLE DEVICE | Site: ANKLE | Status: FUNCTIONAL
Brand: GORILLA PLATING SYSTEM

## 2022-02-25 RX ORDER — SODIUM CHLORIDE, SODIUM LACTATE, POTASSIUM CHLORIDE, CALCIUM CHLORIDE 600; 310; 30; 20 MG/100ML; MG/100ML; MG/100ML; MG/100ML
25 INJECTION, SOLUTION INTRAVENOUS CONTINUOUS
Status: DISCONTINUED | OUTPATIENT
Start: 2022-02-25 | End: 2022-02-25 | Stop reason: HOSPADM

## 2022-02-25 RX ORDER — ONDANSETRON 2 MG/ML
INJECTION INTRAMUSCULAR; INTRAVENOUS AS NEEDED
Status: DISCONTINUED | OUTPATIENT
Start: 2022-02-25 | End: 2022-02-25 | Stop reason: HOSPADM

## 2022-02-25 RX ORDER — ROPIVACAINE HYDROCHLORIDE 5 MG/ML
INJECTION, SOLUTION EPIDURAL; INFILTRATION; PERINEURAL
Status: COMPLETED | OUTPATIENT
Start: 2022-02-25 | End: 2022-02-25

## 2022-02-25 RX ORDER — VANCOMYCIN HYDROCHLORIDE 1 G/20ML
INJECTION, POWDER, LYOPHILIZED, FOR SOLUTION INTRAVENOUS AS NEEDED
Status: DISCONTINUED | OUTPATIENT
Start: 2022-02-25 | End: 2022-02-25 | Stop reason: HOSPADM

## 2022-02-25 RX ORDER — MIDAZOLAM HYDROCHLORIDE 1 MG/ML
1 INJECTION, SOLUTION INTRAMUSCULAR; INTRAVENOUS AS NEEDED
Status: DISCONTINUED | OUTPATIENT
Start: 2022-02-25 | End: 2022-02-25 | Stop reason: HOSPADM

## 2022-02-25 RX ORDER — DEXAMETHASONE SODIUM PHOSPHATE 4 MG/ML
INJECTION, SOLUTION INTRA-ARTICULAR; INTRALESIONAL; INTRAMUSCULAR; INTRAVENOUS; SOFT TISSUE AS NEEDED
Status: DISCONTINUED | OUTPATIENT
Start: 2022-02-25 | End: 2022-02-25 | Stop reason: HOSPADM

## 2022-02-25 RX ORDER — SUCCINYLCHOLINE CHLORIDE 20 MG/ML
INJECTION INTRAMUSCULAR; INTRAVENOUS AS NEEDED
Status: DISCONTINUED | OUTPATIENT
Start: 2022-02-25 | End: 2022-02-25 | Stop reason: HOSPADM

## 2022-02-25 RX ORDER — OXYCODONE HYDROCHLORIDE 5 MG/1
TABLET ORAL
Status: COMPLETED
Start: 2022-02-25 | End: 2022-02-25

## 2022-02-25 RX ORDER — FENTANYL CITRATE 50 UG/ML
50 INJECTION, SOLUTION INTRAMUSCULAR; INTRAVENOUS AS NEEDED
Status: DISCONTINUED | OUTPATIENT
Start: 2022-02-25 | End: 2022-02-25 | Stop reason: HOSPADM

## 2022-02-25 RX ORDER — FENTANYL CITRATE 50 UG/ML
INJECTION, SOLUTION INTRAMUSCULAR; INTRAVENOUS
Status: COMPLETED | OUTPATIENT
Start: 2022-02-25 | End: 2022-02-25

## 2022-02-25 RX ORDER — HYDROMORPHONE HYDROCHLORIDE 1 MG/ML
.2-.5 INJECTION, SOLUTION INTRAMUSCULAR; INTRAVENOUS; SUBCUTANEOUS
Status: DISCONTINUED | OUTPATIENT
Start: 2022-02-25 | End: 2022-02-25 | Stop reason: HOSPADM

## 2022-02-25 RX ORDER — ONDANSETRON 4 MG/1
4 TABLET, ORALLY DISINTEGRATING ORAL
Qty: 10 TABLET | Refills: 0 | Status: SHIPPED | OUTPATIENT
Start: 2022-02-25

## 2022-02-25 RX ORDER — KETOROLAC TROMETHAMINE 30 MG/ML
INJECTION, SOLUTION INTRAMUSCULAR; INTRAVENOUS AS NEEDED
Status: DISCONTINUED | OUTPATIENT
Start: 2022-02-25 | End: 2022-02-25 | Stop reason: HOSPADM

## 2022-02-25 RX ORDER — PROPOFOL 10 MG/ML
INJECTION, EMULSION INTRAVENOUS AS NEEDED
Status: DISCONTINUED | OUTPATIENT
Start: 2022-02-25 | End: 2022-02-25 | Stop reason: HOSPADM

## 2022-02-25 RX ORDER — ONDANSETRON 2 MG/ML
4 INJECTION INTRAMUSCULAR; INTRAVENOUS AS NEEDED
Status: DISCONTINUED | OUTPATIENT
Start: 2022-02-25 | End: 2022-02-25 | Stop reason: HOSPADM

## 2022-02-25 RX ORDER — ROCURONIUM BROMIDE 10 MG/ML
INJECTION, SOLUTION INTRAVENOUS AS NEEDED
Status: DISCONTINUED | OUTPATIENT
Start: 2022-02-25 | End: 2022-02-25 | Stop reason: HOSPADM

## 2022-02-25 RX ORDER — ASPIRIN 81 MG/1
81 TABLET ORAL DAILY
COMMUNITY
End: 2022-02-25

## 2022-02-25 RX ORDER — MIDAZOLAM HYDROCHLORIDE 1 MG/ML
INJECTION, SOLUTION INTRAMUSCULAR; INTRAVENOUS
Status: COMPLETED | OUTPATIENT
Start: 2022-02-25 | End: 2022-02-25

## 2022-02-25 RX ORDER — FENTANYL CITRATE 50 UG/ML
25 INJECTION, SOLUTION INTRAMUSCULAR; INTRAVENOUS
Status: DISCONTINUED | OUTPATIENT
Start: 2022-02-25 | End: 2022-02-25 | Stop reason: HOSPADM

## 2022-02-25 RX ORDER — LIDOCAINE HYDROCHLORIDE 10 MG/ML
0.1 INJECTION, SOLUTION EPIDURAL; INFILTRATION; INTRACAUDAL; PERINEURAL AS NEEDED
Status: DISCONTINUED | OUTPATIENT
Start: 2022-02-25 | End: 2022-02-25 | Stop reason: HOSPADM

## 2022-02-25 RX ORDER — OXYCODONE HYDROCHLORIDE 5 MG/1
10 TABLET ORAL ONCE
Status: COMPLETED | OUTPATIENT
Start: 2022-02-25 | End: 2022-02-25

## 2022-02-25 RX ORDER — OXYCODONE HYDROCHLORIDE 5 MG/1
5 TABLET ORAL
Qty: 30 TABLET | Refills: 0 | Status: SHIPPED | OUTPATIENT
Start: 2022-02-25 | End: 2022-03-02

## 2022-02-25 RX ORDER — LIDOCAINE HYDROCHLORIDE 20 MG/ML
INJECTION, SOLUTION EPIDURAL; INFILTRATION; INTRACAUDAL; PERINEURAL AS NEEDED
Status: DISCONTINUED | OUTPATIENT
Start: 2022-02-25 | End: 2022-02-25 | Stop reason: HOSPADM

## 2022-02-25 RX ADMIN — ONDANSETRON HYDROCHLORIDE 4 MG: 2 INJECTION, SOLUTION INTRAMUSCULAR; INTRAVENOUS at 10:25

## 2022-02-25 RX ADMIN — OXYCODONE HYDROCHLORIDE 10 MG: 5 TABLET ORAL at 10:39

## 2022-02-25 RX ADMIN — CEFAZOLIN SODIUM 3 G: 10 INJECTION, POWDER, FOR SOLUTION INTRAVENOUS at 08:05

## 2022-02-25 RX ADMIN — FENTANYL CITRATE 25 MCG: 50 INJECTION, SOLUTION INTRAMUSCULAR; INTRAVENOUS at 10:20

## 2022-02-25 RX ADMIN — PROPOFOL 200 MG: 10 INJECTION, EMULSION INTRAVENOUS at 07:58

## 2022-02-25 RX ADMIN — ROPIVACAINE HYDROCHLORIDE 20 ML: 5 INJECTION, SOLUTION EPIDURAL; INFILTRATION; PERINEURAL at 07:16

## 2022-02-25 RX ADMIN — SUCCINYLCHOLINE CHLORIDE 200 MG: 20 INJECTION, SOLUTION INTRAMUSCULAR; INTRAVENOUS at 07:59

## 2022-02-25 RX ADMIN — FENTANYL CITRATE 25 MCG: 50 INJECTION, SOLUTION INTRAMUSCULAR; INTRAVENOUS at 10:25

## 2022-02-25 RX ADMIN — FENTANYL CITRATE 50 MCG: 50 INJECTION, SOLUTION INTRAMUSCULAR; INTRAVENOUS at 07:16

## 2022-02-25 RX ADMIN — LIDOCAINE HYDROCHLORIDE 100 MG: 20 INJECTION, SOLUTION INTRAVENOUS at 07:58

## 2022-02-25 RX ADMIN — ROPIVACAINE HYDROCHLORIDE 10 ML: 5 INJECTION, SOLUTION EPIDURAL; INFILTRATION; PERINEURAL at 07:20

## 2022-02-25 RX ADMIN — KETOROLAC TROMETHAMINE 30 MG: 30 INJECTION, SOLUTION INTRAMUSCULAR; INTRAVENOUS at 09:43

## 2022-02-25 RX ADMIN — MIDAZOLAM 3 MG: 1 INJECTION INTRAMUSCULAR; INTRAVENOUS at 07:16

## 2022-02-25 RX ADMIN — FENTANYL CITRATE 25 MCG: 50 INJECTION, SOLUTION INTRAMUSCULAR; INTRAVENOUS at 10:40

## 2022-02-25 RX ADMIN — FENTANYL CITRATE 25 MCG: 50 INJECTION, SOLUTION INTRAMUSCULAR; INTRAVENOUS at 10:35

## 2022-02-25 RX ADMIN — ONDANSETRON HYDROCHLORIDE 4 MG: 2 INJECTION, SOLUTION INTRAMUSCULAR; INTRAVENOUS at 09:27

## 2022-02-25 RX ADMIN — FENTANYL CITRATE 50 MCG: 50 INJECTION, SOLUTION INTRAMUSCULAR; INTRAVENOUS at 08:36

## 2022-02-25 RX ADMIN — DEXAMETHASONE SODIUM PHOSPHATE 8 MG: 4 INJECTION, SOLUTION INTRAMUSCULAR; INTRAVENOUS at 08:06

## 2022-02-25 RX ADMIN — MEPERIDINE HYDROCHLORIDE 12.5 MG: 25 INJECTION INTRAMUSCULAR; INTRAVENOUS; SUBCUTANEOUS at 10:32

## 2022-02-25 RX ADMIN — MIDAZOLAM 2 MG: 1 INJECTION INTRAMUSCULAR; INTRAVENOUS at 07:49

## 2022-02-25 RX ADMIN — ROCURONIUM BROMIDE 10 MG: 10 INJECTION INTRAVENOUS at 07:58

## 2022-02-25 RX ADMIN — SODIUM CHLORIDE, POTASSIUM CHLORIDE, SODIUM LACTATE AND CALCIUM CHLORIDE 25 ML/HR: 600; 310; 30; 20 INJECTION, SOLUTION INTRAVENOUS at 07:06

## 2022-02-25 RX ADMIN — Medication 3 AMPULE: at 07:06

## 2022-02-25 RX ADMIN — MEPERIDINE HYDROCHLORIDE 12.5 MG: 25 INJECTION INTRAMUSCULAR; INTRAVENOUS; SUBCUTANEOUS at 10:27

## 2022-02-25 RX ADMIN — FENTANYL CITRATE 50 MCG: 50 INJECTION, SOLUTION INTRAMUSCULAR; INTRAVENOUS at 09:29

## 2022-02-25 NOTE — ANESTHESIA PROCEDURE NOTES
Peripheral Block    Start time: 2/25/2022 7:11 AM  End time: 2/25/2022 7:16 AM  Performed by: Toño Cloud MD  Authorized by: Toño Cloud MD       Pre-procedure:    Indications: at surgeon's request and post-op pain management    Preanesthetic Checklist: patient identified, risks and benefits discussed, site marked, timeout performed, anesthesia consent given and patient being monitored      Block Type:   Block Type:  Popliteal  Laterality:  Right  Monitoring:  Standard ASA monitoring, continuous pulse ox, frequent vital sign checks, heart rate, responsive to questions and oxygen  Injection Technique:  Single shot  Procedures: ultrasound guided    Patient Position: supine  Prep: chlorhexidine    Location:  Mid thigh  Needle Type:  Stimuplex  Needle Gauge:  20 G  Needle Localization:  Ultrasound guidance  Medication Injected:  FentaNYL citrate (PF) injection sedation initial, 50 mcg  fentaNYL citrate (PF) injection sedation additional, 50 mcg  midazolam (VERSED) injection, 3 mg  ropivacaine (PF) (NAROPIN)(0.5%) 5 mg/mL injection, 20 mL  Med Admin Time: 2/25/2022 7:16 AM    Assessment:  Number of attempts:  1  Injection Assessment:  Incremental injection every 5 mL, no paresthesia, ultrasound image on chart, no intravascular symptoms, negative aspiration for blood and local visualized surrounding nerve on ultrasound  Patient tolerance:  Patient tolerated the procedure well with no immediate complications

## 2022-02-25 NOTE — DISCHARGE INSTRUCTIONS
Follow-Up:  Call for 1 week appointment    Weightbearing:  Nonweightbearing on your operative extremity. You may use crutches, a rolling knee scooter, or walker to help with ambulation. Dressings:  Leave your dressing/splint in place until your post-operative visit. Keep it clean and dry. Mild to moderate blood or drainage after surgery is expected. Blood Clot Medication:  You have been prescribed Aspirin to help reduce your risk of developing blood clots after surgery. You should start this the morning following your surgery. Pain Control: For pain control, you have been prescribed a narcotic pain medication. You may also take tylenol and NSAIDs such as ibuprofen for pain control. You should wean from the narcotic medication as you are able. Do not drink alcohol or drive while using narcotic pain medication. You should also keep your foot elevated as much as possible. Avoid pressure under the heel by placing pillows under the calf, not your foot. Constipation:  While taking narcotics, you may have constipation. A stool softener is recommended. You may use over-the-counter stool softeners such as Miralax, Colace, and Senna. Nausea and Vomiting:  Sometimes after surgery, patients have nausea or vomiting. A medicine (Zofran) has been prescribed for this. If you do not have nausea or vomiting, then you do you not need to take this. General Considerations:  1. Keep your foot elevated as much as possible after surgery. It is ideal to have it positioned above your heart to allow swelling to subside. You can place it on several pillows or on the back of the couch. While eating, rest it on another chair at the dinner table. The worst swelling occurs the frst week or two after surgery, but can take much longer up to months to fully subside. 2. Placing ice packs on the outside of the bandage may help with pain and swelling. We recommend 20-30 minutes on and then 90 minutes off. Also, do not place ice packs directly on the skin or on the toes themselves (which can impair circulation to the toes). 3. Keep your bandage or dressing dry. If it becomes wet, please contact our office. It will likely be necessary to have you come in to be evaluated and have it changed to a dry one. A wet, saturated dressing can lead to problems with stitches and wound healing. 4. Bathing or showering can be challenging. It is important to keep the dressing or cast dry, so covering it with a cast cover or garbage bag can help. It is important to avoid submerging the leg in case the cover leaks. Sitting in the tub or on a shower chair is also recommended, as it is not safe to balance or stand on one leg.  5. Once your sutures have been removed, it is helpful to massage your scar(s) two to three times daily. This can help moisturize the incision, decrease sensitivity, and break up scar tissue. We recommend you use common over-the-counter products such as cocoa butter, vitamin E oil, or Mederma. Rub along the scar as well across the scar side to side. 6. Do not drive until instructed by your surgeon. You should not drive while taking narcotic pain medications. Even if surgery was performed on your left foot, driving for long periods can lead to swelling and discomfort due to the foot being down. Once you are allowed to drive, try shorter trips to get accustomed to operating a car again. It may help to practice in an empty parking lot to get used to controlling the pedals. DISCHARGE SUMMARY from Nurse    PATIENT INSTRUCTIONS:    After general anesthesia or intravenous sedation, for 24 hours or while taking prescription Narcotics:  · Limit your activities  · Do not drive and operate hazardous machinery  · Do not make important personal or business decisions  · Do  not drink alcoholic beverages  · If you have not urinated within 8 hours after discharge, please contact your surgeon on call.     Report the following to your surgeon:  · Excessive pain, swelling, redness or odor of or around the surgical area  · Temperature over 100.5  · Nausea and vomiting lasting longer than 4 hours or if unable to take medications  · Any signs of decreased circulation or nerve impairment to extremity: change in color, persistent  numbness, tingling, coldness or increase pain  · Any questions    The discharge information has been reviewed with the patient and spouse. The patient and spouse verbalized understanding. Discharge medications reviewed with the patient and spouse and appropriate educational materials and side effects teaching were provided. I.     Oxycodone, Rapid Release (ETH-Oxydose, Oxy IR, Roxicodone) - (By mouth)   Why this medicine is used:   Treats moderate to severe pain. This medicine is a narcotic pain reliever. Contact a nurse or doctor right away if you have:  · Fast or slow heart beat, shallow breathing, blue lips, skin or fingernails  · Anxiety, restlessness, fever, sweating, muscle spasms, twitching, seeing or hearing things that are not there  · Extreme weakness, shallow breathing, slow heartbeat  · Severe confusion, lightheadedness, dizziness, fainting  · Sweating or cold, clammy skin, seizures  · Severe constipation, stomach pain, nausea, vomiting     Common side effects:  · Mild constipation  · Sleepiness, tiredness  II. Ondansetron (Zofran, Zofran ODT, Zuplenz) - (By mouth, Into the mouth)   Why this medicine is used:   Prevents nausea and vomiting. Contact a nurse or doctor right away if you have:  · Fast, pounding, or uneven heartbeat  · Lightheadedness or fainting  · Trouble breathing     Common side effects:  · Headache, tiredness  · Constipation, diarrhea  III. Aspirin (By mouth)   Aspirin (AS-pir-in)  Treats pain, fever, and inflammation. May lower risk of heart attack and stroke.    Brand Name(s): Ascriptin Regular Strength, Aspergum, Aspir Low, Aspirin Adult Low Dose, Aspirin Low Dose, García Aspirin Children's, García Aspirin Regimen, García Extra Strength, García Genuine Aspirin, García Low Dose, Bufferin, Bufferin Low Dose, Durlaza, Ecotrin, Ecpirin   There may be other brand names for this medicine. When This Medicine Should Not Be Used: This medicine is not right for everyone. Do not use it if you had an allergic reaction to aspirin or other NSAIDs, or if you have a history of asthma with nasal polyps and rhinitis. How to Use This Medicine:   Delayed Release Capsule, Long Acting Capsule, Gum, Tablet, Chewable Tablet, Fizzy Tablet, Coated Tablet, Long Acting Tablet, 24 Hour Capsule  · Your doctor will tell you how much medicine to use. Do not use more than directed. · It is best to take this medicine with food or milk. · Capsule, tablet, or coated tablet: Swallow whole. Do not crush, break, or chew it. · Chewable tablet: You may chew it completely or swallow it whole. · Gum: Chew completely to make sure you get as much medicine as possible. Drink a full glass (8 ounces) of water after chewing the gum. · Swallow the extended-release capsule whole. Do not crush, break, or chew it. Take the capsule with a full glass of water at the same time each day. · Follow the instructions on the medicine label if you are using this medicine without a prescription. · Missed dose: If you miss a dose of Durlaza, skip the missed dose and go back to your regular dosing schedule. Do not take extra medicine to make up for a missed dose. · Store the medicine in a closed container at room temperature, away from heat, moisture, and direct light. Drugs and Foods to Avoid:   Ask your doctor or pharmacist before using any other medicine, including over-the-counter medicines, vitamins, and herbal products. · Some foods and medicines can affect how aspirin works.  Tell your doctor if you are using any of the following:  ¨ Dipyridamole, methotrexate, probenecid, sulfinpyrazone, ticlopidine  ¨ Blood thinner (including clopidogrel, prasugrel, ticagrelor, warfarin)  ¨ Blood pressure medicine  ¨ Medicine to treat seizures (including phenytoin, valproic acid)  ¨ NSAID pain or arthritis medicine (including celecoxib, diclofenac, ibuprofen, naproxen)  ¨ Steroid medicine (including dexamethasone, hydrocortisone, methylprednisolone, prednisolone, prednisone)  · Do not take Durlaza 2 hours before or 1 hour after you drink alcohol or take medicines that contain alcohol. Warnings While Using This Medicine:   · Tell your doctor if you are pregnant or breastfeeding. Do not use this medicine during the later part of a pregnancy unless your doctor tells you to. · Tell your doctor if you have kidney disease, liver disease, high blood pressure, heart disease, or a history of stomach bleeding or ulcers. · This medicine may increase your risk for bleeding, including stomach ulcers. · Do not give aspirin to a child or teenager who has chickenpox or flu symptoms, unless the doctor says it is okay. Aspirin can cause a life-threatening reaction called Reye syndrome. · Tell any doctor or dentist who treats you that you are using this medicine. This medicine may affect certain medical test results. · Keep all medicine out of the reach of children. Never share your medicine with anyone. Possible Side Effects While Using This Medicine:   Call your doctor right away if you notice any of these side effects:  · Allergic reaction: Itching or hives, swelling in your face or hands, swelling or tingling in your mouth or throat, chest tightness, trouble breathing  · Bloody or black stools, bloody vomit or vomit that looks like coffee grounds  · Chest tightness, wheezing  · Ringing in the ears  · Severe stomach pain  · Unusual bleeding, bruising, or weakness    IV. Laxative, Stool Softeners (Doculax, Colace, Colace Clear, DSS) - (By mouth)   Why this medicine is used:   Treats constipation by helping you have a bowel movement.   Contact a nurse or doctor right away if you have:  · Dark urine or pale stools  · Vomiting, loss of appetite, stomach pain  · Yellow skin or eyes     Common side effects:  · Nausea, diarrhea, stomach cramps, bitter taste in mouth

## 2022-02-25 NOTE — ROUTINE PROCESS
TRANSFER - IN REPORT:    Verbal report received from DAshley [address] [place] [address]. Khai Monaco RN(name) on [address]  being received from OR(unit) for routine post - op      Report consisted of patients Situation, Background, Assessment and   Recommendations(SBAR). Information from the following report(s) OR Summary was reviewed with the receiving nurse. Opportunity for questions and clarification was provided. Assessment completed upon patients arrival to unit and care assumed.

## 2022-02-25 NOTE — PERIOP NOTES
covid test negative   Took  covid vaccine  X2. Wears mask   No s/s covid virus nor has she adri around anyone with the covid virus that she knows of.   diane completed. In process of being evaluated for sleep apnea. Reports was observed  Stopped breathing by friend. No official dx.    mepilex sacrum border  Preventatively applied. Skin intact. [id number]    Surgery delayed due to tray in or. Pt and family notified. [id number]    Time out done. Pt to have block done see anesthesia sheet. Dr. Alexandria Martinez signed site. Pt on monitor   vss   sr on scope. Nasal 2 liters in place. Right   Leg block to be done. Adductor canal   Block see anesthesia sheet pt received versed 2cc  And fentanyl iv. 2cc  Remainder of versed given to crna.  Selvin Rodas

## 2022-02-25 NOTE — ANESTHESIA POSTPROCEDURE EVALUATION
Procedure(s):  RIGHT REVISION OF OPEN REDUCTION INTERNAL FIXATION SYNDESMOSIS WITH REMOVAL OF DEEP IMPLANT. general, regional    Anesthesia Post Evaluation        Patient location during evaluation: PACU  Note status: Adequate. Level of consciousness: responsive to verbal stimuli and sleepy but conscious  Pain management: satisfactory to patient  Airway patency: patent  Anesthetic complications: no  Cardiovascular status: acceptable  Respiratory status: acceptable  Hydration status: acceptable  Comments: +Post-Anesthesia Evaluation and Assessment    Patient: Nick Gaming MRN: [id number]  SSN: [id number]   YOB: 1985  Age: 29 y.o. Sex: female      Cardiovascular Function/Vital Signs    /69   Pulse 83   Temp 36.7 °C (98.1 °F)   Resp 20   Ht 5' 5\" (1.651 m)   Wt 133 kg (293 lb 3.4 oz)   SpO2 95%   BMI 48.79 kg/m²     Patient is status post Procedure(s):  RIGHT REVISION OF OPEN REDUCTION INTERNAL FIXATION SYNDESMOSIS WITH REMOVAL OF DEEP IMPLANT. Nausea/Vomiting: Controlled. Postoperative hydration reviewed and adequate. Pain:  Pain Scale 1: Numeric (0 - 10) (02/25/22 1045)  Pain Intensity 1: 3 (02/25/22 1045)   Managed. Neurological Status:   Neuro (WDL): Exceptions to WDL (02/25/22 1014)   At baseline. Mental Status and Level of Consciousness: Arousable. Pulmonary Status:   O2 Device: None (Room air) (02/25/22 1025)   Adequate oxygenation and airway patent. Complications related to anesthesia: None    Post-anesthesia assessment completed. No concerns. Signed By: Nahomy Beasley MD    2/25/2022  Post anesthesia nausea and vomiting:  controlled      INITIAL Post-op Vital signs:   Vitals Value Taken Time   /73 02/25/22 1100   Temp 36.7 °C (98.1 °F) 02/25/22 1014   Pulse 90 02/25/22 1108   Resp 22 02/25/22 1108   SpO2 97 % 02/25/22 1108   Vitals shown include unvalidated device data.

## 2022-02-25 NOTE — ANESTHESIA PROCEDURE NOTES
Peripheral Block    Start time: 2/25/2022 7:17 AM  End time: 2/25/2022 7:20 AM  Performed by: Matthew Souza MD  Authorized by: Matthew Souza MD       Pre-procedure: Indications: at surgeon's request and post-op pain management    Preanesthetic Checklist: patient identified, risks and benefits discussed, site marked, timeout performed, anesthesia consent given and patient being monitored      Block Type:   Block Type:   Adductor canal block  Laterality:  Right  Monitoring:  Standard ASA monitoring, continuous pulse ox, frequent vital sign checks, heart rate, oxygen and responsive to questions  Injection Technique:  Single shot  Procedures: ultrasound guided    Patient Position: supine  Prep: chlorhexidine    Location:  Mid thigh  Needle Type:  Stimuplex  Needle Gauge:  20 G  Needle Localization:  Ultrasound guidance  Medication Injected:  Ropivacaine (PF) (NAROPIN)(0.5%) 5 mg/mL injection, 10 mL  Med Admin Time: 2/25/2022 7:20 AM    Assessment:  Number of attempts:  1  Injection Assessment:  Incremental injection every 5 mL, local visualized surrounding nerve on ultrasound, no paresthesia, negative aspiration for blood and no intravascular symptoms  Patient tolerance:  Patient tolerated the procedure well with no immediate complications

## 2022-02-25 NOTE — PERIOP NOTES
TRANSFER - OUT REPORT:    Verbal report given to STARR Oconnor RN(name) on [address]  being transferred to Phase II(unit) for routine progression of care       Report consisted of patients Situation, Background, Assessment and   Recommendations(SBAR). Information from the following report(s) OR Summary, Intake/Output and MAR was reviewed with the receiving nurse. Opportunity for questions and clarification was provided.       Patient transported with:   Registered Nurse

## 2022-02-25 NOTE — ANESTHESIA PREPROCEDURE EVALUATION
Relevant Problems   RESPIRATORY SYSTEM   (+) CORTEZ (dyspnea on exertion)      ENDOCRINE   (+) Morbid obesity (HCC)       Anesthetic History   No history of anesthetic complications            Review of Systems / Medical History  Patient summary reviewed, nursing notes reviewed and pertinent labs reviewed    Pulmonary          Undiagnosed apnea      Comments: STOP BANG 5   Neuro/Psych         Psychiatric history     Cardiovascular  Within defined limits                Exercise tolerance: >4 METS  Comments: 04/21 EKG= SB    05/21 Stress ECHO= neg for ischemia, o/w inconclusive    Heart murmur   GI/Hepatic/Renal     GERD           Endo/Other        Morbid obesity and arthritis     Other Findings   Comments: Right ankle fracture           Physical Exam    Airway  Mallampati: I  TM Distance: > 6 cm  Neck ROM: normal range of motion   Mouth opening: Normal     Cardiovascular    Rhythm: regular  Rate: normal         Dental  No notable dental hx       Pulmonary  Breath sounds clear to auscultation               Abdominal  GI exam deferred       Other Findings            Anesthetic Plan    ASA: 3  Anesthesia type: general and regional - popliteal fossa block      Post-op pain plan if not by surgeon: peripheral nerve block single    Induction: Intravenous  Anesthetic plan and risks discussed with: Patient

## 2022-02-27 NOTE — OP NOTES
OPERATIVE REPORT     PATIENT:  [place]                                        MRN:  [id number]  :  1985  ADMITTING PHYSICIAN:  Miquel Reeves MD  ______________________________________________________________________     DATE OF SURGERY:  2022  SURGEON:  Miquel Reeves MD        ASSISTANT:       PREOPERATIVE DIAGNOSIS(ES):  RIGHT SYNDESMOSIS DISRUPTION     POSTOPERATIVE DIAGNOSIS(ES):  RIGHT SYNDESMOSIS DISRUPTION     PROCEDURE(S) PERFORMED:  Procedure(s):  Right revision ORIF syndesmosis  Removal of deep implant     FINDINGS:    ANESTHESIA:   General.     TOURNIQUET TIME:    Total Tourniquet Time Documented:  Thigh (Right) - 85 minutes  Total: Thigh (Right) - 85 minutes       IMPLANTS:    Implant Name Type Inv. Item Serial No.  Lot No. LRB No. Used Action   SCREW BNE L50MM [id number] NONLOCKING GORILLA PLATING SYS R3CON - SN/A  SCREW BNE L50MM [id number] NONLOCKING GORILLA PLATING SYS R3CON N/A PARAGON 28_WD N/A Right 1 Implanted   SCREW BNE L56MM [id number] N CEE FOR GORILLA PLATING SYS R3CON    N/A PARAGON 28 N/A Right 1 Implanted          INDICATIONS FOR THE PROCEDURE:  The patient is a 29year old female who sustained an ankle fracture dislocation. She had previously undergone external fixator placement and subsequent removal and definitive fixation with ORIF of the lateral malleolus, syndesmosis, and deltoid ligament repair. During her post-operative followup, she was noted to have increased widening of the syndesmosis and migration of her tightropes. It was felt that she was losing fixation with the tightropes. We discussed risks and benefits of revising her syndesmotic fixation versus continued close monitoring.   Given the migration of the tightropes noted on post-operative followup xrays and subtle change in alignment at the syndemosis, I was concerned that she was going to continue to lose fixation and the tightropes would pull out of the tibia and felt that she would benefit from revision. After the discussion, she elected to proceed with revision of the syndesmosis. DESCRIPTION OF PROCEDURE:    The correct patient, extremity, and operation were all identified in the preoperative holding area. I marked the operative extremity. A block was administered by the anesthesia team. The patient was then brought back to the operating room and transferred to the operating room table. She was positioned supine. General anesthesia was then induced. All bony prominences were well-padded. A tourniquet was applied to the operative extremity. The operative extremity was then prepped and draped in the usual sterile fashion. A preoperative time-out was called and again the correct patient operation and extremity were all identified, preoperative antibiotics were given IV within 30 minutes of the incision, all parties were in agreement and we proceeded with the operation. Under fluoroscopy, we first localized the area of the tightropes both medially and laterally. I also performed an external rotation stress test and did not notice any widening of the medial clear space. An Esmarch was used to exsanguinate the lower extremity, and the tourniquet was inflated. I first proceeded with the lateral approach. We utilized the distal portion of her prior incision. Sharp dissection was carried out through the skin and superficial tissues. Scissor dissection was performed in a layered fashion down to the level of the fibula and the plate. We identified the tightropes, and they were noted to be loose. I debrided the syndesmosis of any scar tissue that had formed. I then turned attention medially. I utilized a portion of her prior medial incision overlying the suture button. Sharp dissection was performed through the skin and superficial tissues. Blunt dissection with a hemostat was performed down to the level of the tibia. We identified the buttons of the tightrope.     We then provisionally placed a periarticular clamp across the syndesmosis. I placed an additional pointed reduction clamp at the distal fibula in an effort to help control reduction of the syndesmosis. The suture of the tightropes were cut and the tightropes were then removed in its entirety. We then reduced the syndesmosis and lightly tightened the periarticular clamp to help hold the reduction. We confirmed this both clinically and under fluoroscopic guidance. We then placed drilled and placed two 4.3mm Delmar screws across the syndesmosis. Care was taken to aim these more anteriorly and to avoid the prior track from the tightropes. This was performed under fluoroscopic guidance. I felt that we had satisfactory reduction and purchase of the screws across the syndesmosis. The fibula fracture was maintained in good alignment, therefore I did not feel that we needed to revise her fibular fixation. I performed an external rotation stress test and did not notice any medial clear space widening, therefore I also did not feel that we needed to revise her deltoid ligament repair. The incisions were then copiously irrigated with sterile saline. Vancomycin power was inserted into the deep layers. The deep fascial layers were closed with 2-0 vicryl. The subcuticular layers were closed with 3-0 monocryl, and the skin was closed with 3-0 nylon. A sterile dressing was then applied, followed by a short leg splint. She was then awakened from general anesthesia and transferred to PACU in stable condition. COMPLICATIONS:  None.

## 2022-02-28 ENCOUNTER — TELEPHONE (OUTPATIENT)
Dept: SLEEP MEDICINE | Age: 37
End: 2022-02-28

## 2022-02-28 DIAGNOSIS — G47.419 NARCOLEPSY WITHOUT CATAPLEXY: Primary | ICD-10-CM

## 2022-02-28 NOTE — TELEPHONE ENCOUNTER
Reviewed sleep study results with patient. She expressed understanding and she is willing to proceed with a PSG/MSLT. Please call pt to schedule PSG/MSLT.

## 2022-02-28 NOTE — TELEPHONE ENCOUNTER
Pavan Chin is to be contacted by lead sleep technologist regarding results of Sleep Testing which was indicative of an average AHI of 1.6 per hour with an SpO2 lashawn of 90% and SpO2 of < 88% being 0.0 minutes. Attended night and day testing is recommended to assess the complaint of excessive daytime sleepiness reported by the patient at the time of initial visit. Patient should be educated on the risks versus benefits of this elective sleep testing procedure being done during the pandemic and their consent be obtained. Encounter Diagnosis   Name Primary?     Narcolepsy without cataplexy Yes       Orders Placed This Encounter    MULTIPLE SLEEP LATENCY TEST     Standing Status:   Future     Standing Expiration Date:   2/28/2023    DRUG ABUSE PROF, URINE (SEVEN DRUGS), MS COFIRM     Standing Status:   Future     Standing Expiration Date:   8/28/2022    POLYSOMNOGRAPHY 1 NIGHT     Order Specific Question:   Reason for Exam     Answer:   Narcolepsy

## 2022-03-19 PROBLEM — R06.09 DOE (DYSPNEA ON EXERTION): Status: ACTIVE | Noted: 2021-04-28

## 2022-03-19 PROBLEM — E66.01 MORBID OBESITY (HCC): Status: ACTIVE | Noted: 2021-04-28

## 2022-03-19 PROBLEM — S82.891A CLOSED RIGHT ANKLE FRACTURE: Status: ACTIVE | Noted: 2022-01-05

## 2022-03-19 PROBLEM — R07.9 CHEST PAIN AT REST: Status: ACTIVE | Noted: 2021-04-28

## 2022-04-18 ENCOUNTER — HOSPITAL ENCOUNTER (OUTPATIENT)
Dept: SLEEP MEDICINE | Age: 37
Discharge: HOME | End: 2022-04-18
Payer: COMMERCIAL

## 2022-04-18 VITALS
WEIGHT: 293 LBS | TEMPERATURE: 98.2 F | SYSTOLIC BLOOD PRESSURE: 119 MMHG | OXYGEN SATURATION: 98 % | HEART RATE: 78 BPM | HEIGHT: 65 IN | DIASTOLIC BLOOD PRESSURE: 72 MMHG | BODY MASS INDEX: 48.82 KG/M2

## 2022-04-18 PROCEDURE — 95810 POLYSOM 6/> YRS 4/> PARAM: CPT | Performed by: INTERNAL MEDICINE

## 2022-04-19 ENCOUNTER — DOCUMENTATION ONLY (OUTPATIENT)
Dept: SLEEP MEDICINE | Age: 37
End: 2022-04-19

## 2022-04-19 ENCOUNTER — TELEPHONE (OUTPATIENT)
Dept: SLEEP MEDICINE | Age: 37
End: 2022-04-19

## 2022-04-19 ENCOUNTER — HOSPITAL ENCOUNTER (OUTPATIENT)
Dept: SLEEP MEDICINE | Age: 37
Discharge: HOME | End: 2022-04-19
Payer: COMMERCIAL

## 2022-04-19 DIAGNOSIS — Z02.83 ENCOUNTER FOR DRUG SCREENING: ICD-10-CM

## 2022-04-19 DIAGNOSIS — G47.11 IDIOPATHIC HYPERSOMNIA: Primary | ICD-10-CM

## 2022-04-19 DIAGNOSIS — G47.419 NARCOLEPSY WITHOUT CATAPLEXY: Primary | ICD-10-CM

## 2022-04-19 PROCEDURE — 95805 MULTIPLE SLEEP LATENCY TEST: CPT

## 2022-04-19 PROCEDURE — 95805 MULTIPLE SLEEP LATENCY TEST: CPT | Performed by: INTERNAL MEDICINE

## 2022-04-19 NOTE — PROGRESS NOTES
[address] [facility]. [place], [address]  Tel.  [phone]  Fax. [address] [facility] [address]  [place], [address] [facility]  Tel.  [phone]  Fax. [phone] [address]  Tel.  [phone]  Fax. [phone]     Sleep Study Technical Notes        PRE-Test:  Lydia (: 1985) arrived in the lobby. Patient was greeted and screening questions asked. The patient was taken to the Sleep Center and taken directly to his/her room. Patient arrived to the sleep lab via wheelchair. She also brought her own walker. Pt had broken her ankle.  Vitals:  o Temperature (98.2)   o BP (119/77)   o SaO2 (98)   o Weight per patient (293)   Procedure explained to the patient and questions were answered. The patient expressed understanding of the procedure. Electrodes were applied without incident. The patient was placed in bed and the study was started. Brian [facility] Sleep aid taken:  No      Acquisition Notes:   Lights off: 9:10  pm     Respiratory events: Hypopneas, central apneas   ECG:  NSR   Snoring:  None    PAP titration:   o n/a     Positional therapy with:     Other comments: Pt did not have or take any pain medication for her injury. o Patient had caregiver in attendance:  No  o Patient watched TV or on phone after lights out for 0 hours  o Patient slept with positional therapy:  No  o Patient slept with head of bed elevated:  No  o Patient wore an oral appliance:  No  o Patient to bathroom 1 time w/ walker      POST Test:   Patient was awakened. Appropriate electrodes were removed in preparation for the MSLT. The patient answered  the Post Questionnaire.  Equipment cleaned per infection control policy.

## 2022-04-19 NOTE — PROGRESS NOTES
Encounter Diagnoses   Name Primary?     Narcolepsy without cataplexy Yes    Encounter for drug screening      Orders Placed This Encounter    DRUG ABUSE PROF, URINE (SEVEN DRUGS), MS COFIRM     Standing Status:   Future     Number of Occurrences:   1     Standing Expiration Date:   10/19/2022

## 2022-04-19 NOTE — PROGRESS NOTES
[address] [place]. [place], [address]  Tel.  [phone]  Fax. [address] [facility] [address]  [place], [address] [facility]  Tel.  [phone]  Fax. [phone] [address] [facility] [address]  Tel.  [phone]  Fax. [phone]     Sleep Study Technical Notes        PRE-Test:  Rachell Willis (: 1985 remained after night sleep study for a MSLT (Multiple Sleep Latency Test)      Acquisition Notes:  Nap 1: Sleep Latency, 8.5 minutes; No REM  Nap 2: Sleep Latency, 10.5 minutes; No REM   Nap 3: Sleep Latency, 15 minutes; No REM  Nap 4: Sleep Latency, 5 minutes; No REM   Nap 5: Sleep Latency, 7 minutes; No REM     Other comments: Patient was in a wheelchair and used a walker to get around the room, due to her broken foot. Patient refused assistance to the lobby. Patient given breakfast after Nap 1 & Lunch after Nap 3. Urine specimen collected per protocol. Patient had caregiver in attendance:  N  Pediatric Patient:  Parent accompanied patient:  N  Parent slept in bed with patient:  N      POST Test:  Patient was awakened. Electrodes were removed. The patient was discharged after answering the Post Questionnaire. Patient stated that he/she was alert and ok to drive. Equipment and room cleaned per infection control policy.

## 2022-04-20 LAB
AMPHETAMINES UR QL SCN: NEGATIVE NG/ML
BARBITURATES UR QL SCN: NEGATIVE NG/ML
BENZODIAZ UR QL: NEGATIVE NG/ML
BZE UR QL: NEGATIVE NG/ML
CANNABINOIDS UR QL SCN: NEGATIVE NG/ML
OPIATES UR QL: NEGATIVE NG/ML
PCP UR QL: NEGATIVE NG/ML

## 2022-04-29 ENCOUNTER — DOCUMENTATION ONLY (OUTPATIENT)
Dept: SLEEP MEDICINE | Age: 37
End: 2022-04-29

## 2022-05-11 RX ORDER — MODAFINIL 200 MG/1
200 TABLET ORAL
Qty: 30 TABLET | Refills: 5 | Status: SHIPPED | OUTPATIENT
Start: 2022-05-11

## 2022-05-11 NOTE — TELEPHONE ENCOUNTER
PSG(04-18-22): Apnea/Hypopnea index of 3.1 which indicates no signifiant sleep apnea. [id number]): Moderate daytime sleepiness as evidenced by an average latency of 7:00 minutes, Stage REM was not noted in any of the naps. Urine Drug Screen (07-): Negative. Orders Placed This Encounter    modafiniL (ProvigiL) 200 mg tablet     Sig: Take 1 Tablet by mouth every morning. Max Daily Amount: 200 mg. Dispense:  30 Tablet     Refill:  5     She was informed on this medications including side effects and adverse reactions profile. Supplemental Oxygen (see orders)

## 2022-05-20 ENCOUNTER — TELEPHONE (OUTPATIENT)
Dept: SLEEP MEDICINE | Age: 37
End: 2022-05-20

## 2022-05-20 ENCOUNTER — DOCUMENTATION ONLY (OUTPATIENT)
Dept: SLEEP MEDICINE | Age: 37
End: 2022-05-20

## 2022-05-20 DIAGNOSIS — G47.11 IDIOPATHIC HYPERSOMNIA: Primary | ICD-10-CM

## 2022-05-20 NOTE — TELEPHONE ENCOUNTER
Patient says insurance will not pay for her medication.   She would like to know if she could try something different

## 2022-06-03 RX ORDER — DEXTROAMPHETAMINE SACCHARATE, AMPHETAMINE ASPARTATE, DEXTROAMPHETAMINE SULFATE AND AMPHETAMINE SULFATE 5; 5; 5; 5 MG/1; MG/1; MG/1; MG/1
20 TABLET ORAL 2 TIMES DAILY
Qty: 60 TABLET | Refills: 0 | Status: SHIPPED | OUTPATIENT
Start: 2022-08-03 | End: 2022-08-30 | Stop reason: SDUPTHER

## 2022-06-03 RX ORDER — DEXTROAMPHETAMINE SACCHARATE, AMPHETAMINE ASPARTATE, DEXTROAMPHETAMINE SULFATE AND AMPHETAMINE SULFATE 5; 5; 5; 5 MG/1; MG/1; MG/1; MG/1
20 TABLET ORAL 2 TIMES DAILY
Qty: 60 TABLET | Refills: 0 | Status: SHIPPED | OUTPATIENT
Start: 2022-07-03 | End: 2022-08-30 | Stop reason: SDUPTHER

## 2022-06-03 RX ORDER — DEXTROAMPHETAMINE SACCHARATE, AMPHETAMINE ASPARTATE, DEXTROAMPHETAMINE SULFATE AND AMPHETAMINE SULFATE 5; 5; 5; 5 MG/1; MG/1; MG/1; MG/1
20 TABLET ORAL 2 TIMES DAILY
Qty: 60 TABLET | Refills: 0 | Status: SHIPPED | OUTPATIENT
Start: 2022-06-03 | End: 2022-08-30 | Stop reason: SDUPTHER

## 2022-06-03 NOTE — TELEPHONE ENCOUNTER
Orders Placed This Encounter    dextroamphetamine-amphetamine (ADDERALL) 20 mg tablet     Sig: Take 1 Tablet by mouth two (2) times a day. Max Daily Amount: 40 mg. Dispense:  60 Tablet     Refill:  0    dextroamphetamine-amphetamine (ADDERALL) 20 mg tablet     Sig: Take 1 Tablet by mouth two (2) times a day. Max Daily Amount: 40 mg. Dispense:  60 Tablet     Refill:  0     DO NOT FILL BEFORE START DATE.  dextroamphetamine-amphetamine (ADDERALL) 20 mg tablet     Sig: Take 1 Tablet by mouth two (2) times a day. Max Daily Amount: 40 mg. Dispense:  60 Tablet     Refill:  0     DO NOT FILL BEFORE START DATE. Spoke with patient prescription set to pharmacy. Patient to return for follow-up in 3 months.

## 2022-08-30 ENCOUNTER — TELEPHONE (OUTPATIENT)
Dept: SLEEP MEDICINE | Age: 37
End: 2022-08-30

## 2022-08-30 ENCOUNTER — OFFICE VISIT (OUTPATIENT)
Dept: SLEEP MEDICINE | Age: 37
End: 2022-08-30
Payer: COMMERCIAL

## 2022-08-30 VITALS
TEMPERATURE: 98 F | OXYGEN SATURATION: 97 % | HEIGHT: 65 IN | SYSTOLIC BLOOD PRESSURE: 125 MMHG | BODY MASS INDEX: 46.05 KG/M2 | DIASTOLIC BLOOD PRESSURE: 83 MMHG | HEART RATE: 76 BPM | WEIGHT: 276.4 LBS

## 2022-08-30 DIAGNOSIS — Z79.899 ENCOUNTER FOR DRUG THERAPY: ICD-10-CM

## 2022-08-30 DIAGNOSIS — Z86.59 H/O: DEPRESSION: ICD-10-CM

## 2022-08-30 DIAGNOSIS — G47.10 HYPERSOMNIA, UNSPECIFIED: Primary | ICD-10-CM

## 2022-08-30 DIAGNOSIS — G47.11 IDIOPATHIC HYPERSOMNIA: ICD-10-CM

## 2022-08-30 PROCEDURE — 99215 OFFICE O/P EST HI 40 MIN: CPT | Performed by: INTERNAL MEDICINE

## 2022-08-30 RX ORDER — DEXTROAMPHETAMINE SACCHARATE, AMPHETAMINE ASPARTATE, DEXTROAMPHETAMINE SULFATE AND AMPHETAMINE SULFATE 5; 5; 5; 5 MG/1; MG/1; MG/1; MG/1
20 TABLET ORAL 2 TIMES DAILY
Qty: 60 TABLET | Refills: 0 | Status: SHIPPED | OUTPATIENT
Start: 2022-11-29

## 2022-08-30 RX ORDER — DEXTROAMPHETAMINE SACCHARATE, AMPHETAMINE ASPARTATE, DEXTROAMPHETAMINE SULFATE AND AMPHETAMINE SULFATE 5; 5; 5; 5 MG/1; MG/1; MG/1; MG/1
20 TABLET ORAL 2 TIMES DAILY
Qty: 60 TABLET | Refills: 0 | Status: SHIPPED | OUTPATIENT
Start: 2022-09-29

## 2022-08-30 RX ORDER — DEXTROAMPHETAMINE SACCHARATE, AMPHETAMINE ASPARTATE, DEXTROAMPHETAMINE SULFATE AND AMPHETAMINE SULFATE 5; 5; 5; 5 MG/1; MG/1; MG/1; MG/1
20 TABLET ORAL 2 TIMES DAILY
Qty: 60 TABLET | Refills: 0 | Status: SHIPPED | OUTPATIENT
Start: 2022-10-28

## 2022-08-30 NOTE — PATIENT INSTRUCTIONS
[address] [facility] [address] [place], [address]  Tel.  [phone]  Fax. 100 [facility] 60  [place], [address]  Tel.  [phone]  Fax. [phone] [address] [place] [address]  Tel.  [phone]  Fax. [phone]       Narcolepsy: After Your Visit  Your Care Instructions  Everybody gets a little sleepy once in a while, during a long car ride or other times when you want to be alert. But some people cannot control their sleepiness. It is no fun to be in the middle of your workday or driving your car down the street and have an overwhelming desire to sleep. This condition is called narcolepsy. Doctors do not know what causes narcolepsy. Your doctor may ask you to keep a sleep diary for a couple of weeks. It will help you and your doctor decide on treatment. It often helps to take limited naps during the day. It also helps to create a good mood and place for nighttime sleep. Your doctor may recommend medicine to help you stay awake during the day or sleep at night. Follow-up care is a key part of your treatment and safety. Be sure to make and go to all appointments, and call your doctor if you are having problems. It's also a good idea to know your test results and keep a list of the medicines you take. How can you care for yourself at home? Try to take 2 or 3 short naps at regular times during the day. After a nap, always give yourself time to become alert before you drive a car or do anything that might cause an accident. Take your medicines exactly as prescribed. Call your doctor if you think you are having a problem with your medicine. You may need to try several medicines before you find the one that works best for you. Try to improve your nighttime sleep habits. Here are a few of the things you could do:  Go to bed only when you are sleepy, and get up at the same time every day, even if you do not feel rested.  This might help you sleep well the next night and the night after that. If you lie awake for longer than 15 minutes, get up, leave the bedroom, and do something quiet, such as read, until you feel sleepy again. Avoid drinking or eating anything with caffeine after 3 p.m. This includes coffee, tea, cola drinks, and chocolate. Make sure your bedroom is not too hot or too cold, and keep it quiet and dark. Make sure your mattress provides good support. Be kind to your body:  Relieve tension with exercise or a massage. Learn and do relaxation techniques. Avoid alcohol, caffeine, nicotine, and illegal drugs. They can increase your anxiety level and cause sleep problems. Get light exercise daily. Gentle stretching, light aerobics, swimming, walking, and riding a bicycle can help to keep you going during the day and to sleep well at night. Eat a healthy diet. You may feel better if you avoid heavy meals and eat more fruits and vegetables. Do not use over-the-counter sleeping pills. They can make your sleep restless. Ask your doctor if any medicines you take could cause sleepiness. For example, cold and allergy medicines can make you drowsy. Consider joining a support group with people who have narcolepsy or other sleep problems. These groups can be a good source of tips for what to do. Also, it can be comforting to talk to people who face similar challenges. Your doctor can tell you how to contact a support group. When should you call for help? Call your doctor now or seek immediate medical care if:  You passed out (lost consciousness). You cannot use your muscles. This may happen very briefly, sometimes after you laugh or are angry, and may only affect part of your body. Watch closely for changes in your health, and be sure to contact your doctor if:  Your sleepiness continues to get worse. Where can you learn more?    Go to [email or web address]  Enter V069 in the search box to learn more about \"Narcolepsy: After Your Visit. \"   © 2006-2011 Healthwise, Incorporated. Care instructions adapted under license by [facility] (which disclaims liability or warranty for this information). This care instruction is for use with your licensed healthcare professional. If you have questions about a medical condition or this instruction, always ask your healthcare professional. [address] any warranty or liability for your use of this information. Content Version: 9.0.09292; Last Revised: September 15, 2009  PROPER SLEEP HYGIENE    What to avoid  Do not have drinks with caffeine, such as coffee or black tea, for 8 hours before bed. Do not smoke or use other types of tobacco near bedtime. Nicotine is a stimulant and can keep you awake. Avoid drinking alcohol late in the evening, because it can cause you to wake in the middle of the night. Do not eat a big meal close to bedtime. If you are hungry, eat a light snack. Do not drink a lot of water close to bedtime, because the need to urinate may wake you up during the night. Do not read or watch TV in bed. Use the bed only for sleeping and sexual activity. What to try  Go to bed at the same time every night, and wake up at the same time every morning. Do not take naps during the day. Keep your bedroom quiet, dark, and cool. Get regular exercise, but not within 3 to 4 hours of your bedtime. .  Sleep on a comfortable pillow and mattress. If watching the clock makes you anxious, turn it facing away from you so you cannot see the time. If you worry when you lie down, start a worry book. Well before bedtime, write down your worries, and then set the book and your concerns aside. Try meditation or other relaxation techniques before you go to bed. If you cannot fall asleep, get up and go to another room until you feel sleepy. Do something relaxing. Repeat your bedtime routine before you go to bed again. Make your house quiet and calm about an hour before bedtime. Turn down the lights, turn off the TV, log off the computer, and turn down the volume on music. This can help you relax after a busy day. Drowsy Driving: The [facility] cites drowsiness as a causing factor in more than 097,034 police reported crashes annually, resulting in 76,000 injuries and 1,500 deaths. Other surveys suggest 55% of people polled have driven while drowsy in the past year, 23% had fallen asleep but not crashed, 3% crashed, and 2% had and accident due to drowsy driving. Who is at risk? Young Drivers: One study of drowsy driving accidents states that 55% of the drivers were under 25 years. Of those, 75% were male. Shift Workers and Travelers: People who work overnight or travel across time zones frequently are at higher risk of experiencing Circadian Rhythm Disorders. They are trying to work and function when their body is programed to sleep. Sleep Deprived: Lack of sleep has a serious impact on your ability to pay attention or focus on a task. Consistently getting less than the average of 8 hours your body needs creates partial or cumulative sleep deprivation. Untreated Sleep Disorders: Sleep Apnea, Narcolepsy, R.L.S., and other sleep disorders (untreated) prevent a person from getting enough restful sleep. This leads to excessive daytime sleepiness and increases the risk for drowsy driving accidents by up to 7 times. Medications / Alcohol: Even over the counter medications can cause drowsiness. Medications that impair a drivers attention should have a warning label. Alcohol naturally makes you sleepy and on its own can cause accidents. Combined with excessive drowsiness its effects are amplified.    Signs of Drowsy Driving:   * You don't remember driving the last few miles   * You may drift out of your allen   * You are unable to focus and your thoughts wander   * You may yawn more often than normal   * You have difficulty keeping your eyes open / nodding off   * Missing traffic signs, speeding, or tailgating  Prevention-   Good sleep hygiene, lifestyle and behavioral choices have the most impact on drowsy driving. There is no substitute for sleep and the average person requires 8 hours nightly. If you find yourself driving drowsy, stop and sleep. Consider the sleep hygiene tips provided during your visit as well. Medication Refill Policy: Refills for all medications require 1 week advance notice. Please have your pharmacy fax a refill request. We are unable to fax, or call in \"controled substance\" medications and you will need to pick these prescriptions up from our office. [employer]hart Activation    Thank you for requesting access to [facility]. Please follow the instructions below to securely access and download your online medical record. [facility] allows you to send messages to your doctor, view your test results, renew your prescriptions, schedule appointments, and more. How Do I Sign Up? In your internet browser, go to [email or web address]. Click on the First Time User? Click Here link in the Sign In box. You will see the New Member Sign Up page. Enter your [facility] Access Code exactly as it appears below. You will not need to use this code after youve completed the sign-up process. If you do not sign up before the expiration date, you must request a new code. [facility] Access Code: Activation code not generated  Current [facility] Status: Active (This is the date your DossierView[employer] access code will )    Enter the last four digits of your Social Security Number (xxxx) and Date of Birth (mm/dd/yyyy) as indicated and click Submit. You will be taken to the next sign-up page. Create a [employer]ossier[employer] ID. This will be your [facility] login ID and cannot be changed, so think of one that is secure and easy to remember. Create a [facility] password. You can change your password at any time. Enter your Password Reset Question and Answer.  This can be used at a later time if you forget your password. Enter your e-mail address. You will receive e-mail notification when new information is available in [address] Click Sign Up. You can now view and download portions of your medical record. Click the Om[employer]rat link to download a portable copy of your medical information. Additional Information    If you have questions, please call [phone]. Remember, [employer] is NOT to be used for urgent needs. For medical emergencies, dial 911.

## 2022-08-30 NOTE — PROGRESS NOTES
[address] [facility] [place], [address]   Tel.  [phone]   Fax. 100 [facility] 60   [place], [address] [facility]   Tel.  [phone]   Fax. [phone] [address] [facility] [address]    Tel.  [phone]   Fax. Yue Bardales (: 1985) is a 40 y.o. female, established patient, seen for narcolepsy follow up and medication management, she was last seen by me on 02-10-22. ASSESSMENT/PLAN:    ICD-10-CM ICD-9-CM    1. Hypersomnia, unspecified  G47.10 780.54 dextroamphetamine-amphetamine (ADDERALL) 20 mg tablet      dextroamphetamine-amphetamine (ADDERALL) 20 mg tablet      dextroamphetamine-amphetamine (ADDERALL) 20 mg tablet      2. H/O: depression  Z86.59 V11.8       3. BMI 45.0-49.9, adult (HCC)  Z68.42 V85.42       4. Idiopathic hypersomnia  G47.11 780.54 dextroamphetamine-amphetamine (ADDERALL) 20 mg tablet      5. Encounter for drug therapy  Z79.899 V58.69 DRUG ABUSE PROF, URINE (SEVEN DRUGS), MS COFIRM      DRUG ABUSE PROF, URINE (SEVEN DRUGS), MS COFIRLOS          Patient has a history and examination consistent with the hypersomnia that may be secondary to lack of adequate nocturnal sleep (she is currently curtailing her sleep duration to attend to her children) and mood disorder. She did not have any Stage REM on either the nocturnal study or daytime naps. This is may be due to REM suppression as a result anti-depressant therapy. If she is able to give herself enough time to sleep and / or other phenomenon associated with narcolepsy develop she should return for repeat testing, preferably off her anti-depressant therapy. She was made aware of the the fact that only 2 prescriptions of adderall. Since she is being closely followed by psychiatry it may be beneficial for her to get her stimulant medication from her psychiatrist.   Follow-up and Dispositions    Return if symptoms worsen or fail to improve. We did offer to see her back at anytime as needed. Follow-up and Dispositions    Return if symptoms worsen or fail to improve. * Continue on current medications    Orders Placed This Encounter    DRUG ABUSE PROF, URINE (SEVEN DRUGS), MS COFIRM     Standing Status:   Future     Number of Occurrences:   1     Standing Expiration Date:   2/28/2023    dextroamphetamine-amphetamine (ADDERALL) 20 mg tablet     Sig: Take 1 Tablet by mouth two (2) times a day. Max Daily Amount: 40 mg. Dispense:  60 Tablet     Refill:  0    dextroamphetamine-amphetamine (ADDERALL) 20 mg tablet     Sig: Take 1 Tablet by mouth two (2) times a day. Max Daily Amount: 40 mg. Dispense:  60 Tablet     Refill:  0     DO NOT FILL BEFORE START DATE. dextroamphetamine-amphetamine (ADDERALL) 20 mg tablet     Sig: Take 1 Tablet by mouth two (2) times a day. Max Daily Amount: 40 mg. Dispense:  60 Tablet     Refill:  0     DO NOT FILL BEFORE START DATE. * She was educated on this medications including side effects and adverse reactions profile. * Counseling was provided regarding the importance of proper sleep hygiene (sleep duration of 7 - 8 hours), planned naps and use of wakefulness promoting agents. * Discussed need for napping to prevent unintentional sleep attacks. * Patient was advised to exercise caution and vigilance while operating machinery and driving. * Patient was asked to contact our office at any time for further questions regarding their sleep symptoms. SUBJECTIVE/OBJECTIVE:  She reports of improved daytime sleepiness since initiation of medication therapy. She is able to get up easily each morning after sleeping about 6 hours. She reports of feeling refreshed on waking and does not feel tired during the day if she takes the medication as prescribed. She is not currently employed and denies impairment in ability to perform her daily activities.  She denies of symptoms indicative of cataplexy, sleep paralysis, or hypnagogic hallucinations. She denies of snoring having been noted. She reports that her mood has been stable, she is on 3 different psych medications and is followed by Dr. Judy Gibson. She sees her about every 3 months. She acknowledges that she does not take her psych meds as regularly as she takes her adderall. CURRENT THERAPY:  dextroamphetamine-amphetamine (ADDERALL) 20 mg tablet - 1 po twice daily  (Last dose taken at about 06:00 am today)    [facility] Data Review:  Review indicates prescription fill dates: 06/03 and 07/27. CURRENT SLEEP PATTERN:   Bedtime: 10:00 pm and midnight same on weekends   Sleep Onset: 15 - 20 minutes   Rise time: 06:00 am and until 07:30 on weekends   Frequency of nocturnal awakenings: 0-1 per night   Napping: none    Frostproof Sleepiness Score: 7   and Modified F.O.S.Q. Score Total / 2: 15    SLEEP TESTING:    PSG(04-18-22): Apnea/Hypopnea index of 3.1 which indicates no signifiant sleep apnea. [id number]): Moderate daytime sleepiness as evidenced by an average latency of 7:00 minutes, Stage REM was not noted in any of the naps. Urine Drug Screen (04-19-22): Negative. Sleep Review of Systems: notable for Negative difficulty falling asleep; Negative awakenings at night; Positive perceived regular dreaming; Negative nightmares; Negative  early morning headaches; Mild  memory problems; Negative  concentration issues; Positive caffeine;  Negative alcohol;   Negative history of any automobile or occupational accidents due to daytime drowsiness. She dozed of and hit the guard rail in December 2021.       Visit Vitals  /83   Pulse 76   Temp 98 °F (36.7 °C)   Ht 5' 5\" (1.651 m)   Wt 276 lb 6.4 oz (125.4 kg)   SpO2 97%   BMI 46.00 kg/m²           General:   Alert, oriented, not in acute distress   Eyes:  Anicteric Sclerae; intact EOM's   Nose:  No obvious nasal septum deviation    Oropharynx:   Mallampati score 4, thick tongue base, uvula not seen due to low-lying soft palate, narrow tonsilo-pharyngeal pilars, tongue scalloped   Neck:   midline trachea,  no JVD   Chest/Lungs:  symmetrical lung expansion ,clear lung fields on auscultation    CVS:  Normal rate, regular rhythm    Extremities:  No obvious rashes, absent edema    Neuro:  No focal deficits; No obvious tremor    Psych:  Normal eye contact; normal  affect, normal countenance      Office visit exceeded 40 minutes with counseling and direction of care taking up more than 50% of the allotted time. Adrian Burks MD, FAASM  Diplomate American Board of Sleep Medicine  Diplomate in Sleep Medicine - ABP    Electronically signed.  08/30/22

## 2022-09-02 LAB
AMPHET+METHAMPHET UR QL: POSITIVE
BARBITURATES UR QL SCN: NEGATIVE NG/ML
BENZODIAZ UR QL: NEGATIVE NG/ML
BZE UR QL: NEGATIVE NG/ML
CANNABINOIDS UR QL SCN: NEGATIVE NG/ML
OPIATES UR QL: NEGATIVE NG/ML
PCP UR QL: NEGATIVE NG/ML

## 2023-04-17 DIAGNOSIS — G47.10 HYPERSOMNIA, UNSPECIFIED: ICD-10-CM

## 2023-04-26 RX ORDER — DEXTROAMPHETAMINE SACCHARATE, AMPHETAMINE ASPARTATE, DEXTROAMPHETAMINE SULFATE AND AMPHETAMINE SULFATE 5; 5; 5; 5 MG/1; MG/1; MG/1; MG/1
TABLET ORAL
Qty: 60 TABLET | Refills: 0 | Status: SHIPPED | OUTPATIENT
Start: 2023-04-26

## 2023-07-05 ENCOUNTER — PROCEDURE VISIT (OUTPATIENT)
Age: 38
End: 2023-07-05

## 2023-07-05 ENCOUNTER — HOSPITAL ENCOUNTER (OUTPATIENT)
Facility: HOSPITAL | Age: 38
Discharge: HOME | End: 2023-07-08

## 2023-07-05 DIAGNOSIS — G47.11 IDIOPATHIC HYPERSOMNIA WITH LONG SLEEP TIME: Primary | ICD-10-CM

## 2023-07-05 DIAGNOSIS — G47.33 OSA (OBSTRUCTIVE SLEEP APNEA): ICD-10-CM

## 2023-07-05 NOTE — PROGRESS NOTES
S>Mayra Askew is a 41 y.o. female seen today to receive a home sleep testing unit (HST). Patient was educated on proper hookup and operation of the HST via detailed instruction sheet . Instruction forms with after hours contact and documentation were signed. O>    There were no vitals taken for this visit. A>  No diagnosis found. P>  General information regarding operations and maintenance of the device was provided. Follow-up appointment was made to return the HST. She will be contacted once the results have been reviewed. She was asked to contact our office for any problems regarding her home sleep test study.

## 2023-07-10 ENCOUNTER — TELEPHONE (OUTPATIENT)
Age: 38
End: 2023-07-10

## 2023-07-10 DIAGNOSIS — G47.33 OSA (OBSTRUCTIVE SLEEP APNEA): Primary | ICD-10-CM

## 2023-07-11 NOTE — TELEPHONE ENCOUNTER
Sherri Eller is to be contacted by sleep technologists regarding results of Sleep Testing which was indicative of an average AHI of 6.0 per hour with an SpO2 sherlyn of 89% and SpO2 of < 88% being 0.00 minutes. An APAP prescription has been written and patient will be contacted by office staff regarding follow-up  in 2-3 months after initiation of therapy. Encounter Diagnosis   Name Primary? DC (obstructive sleep apnea) Yes       Orders Placed This Encounter   Procedures    DME Order for (Specify) as OP     - DME device ordered - ResMed Device with Heated Humidifer  / .   - Diagnosis: Obstructive Sleep Apnea (G47.33)  - Length of Need: Lifetime    Pressure Settin - 15 cmH2O     Nasal Cushion (Replace) 2 per month.  Nasal Interface Mask 1 every 3 months.  Headgear 1 every 6 months.  Filter(s) Disposable 2 per month.  Filter(s) Non-Disposable 1 every 6 months. [address]  for Alivia Salter (Replace) 1 every 6 months.  Tubing with heating element 1 every 3 months. Perform Mask Fitting per patient preference and comfort - replace as above. Olimpia Pollack MD, FAASM; NPI: [id number]  Electronically signed.  2023

## 2023-07-12 NOTE — TELEPHONE ENCOUNTER
Reviewed sleep study results with patient. She expressed understanding and is willing to proceed with a trial of APAP. Please fax DME order & Schedule 1st adherence visit in 60 to 90 days.

## 2023-08-01 ENCOUNTER — CLINICAL DOCUMENTATION (OUTPATIENT)
Age: 38
End: 2023-08-01

## 2023-09-11 ENCOUNTER — TELEPHONE (OUTPATIENT)
Age: 38
End: 2023-09-11

## 2023-09-28 ENCOUNTER — OFFICE VISIT (OUTPATIENT)
Age: 38
End: 2023-09-28
Payer: COMMERCIAL

## 2023-09-28 VITALS
HEART RATE: 80 BPM | TEMPERATURE: 98.4 F | SYSTOLIC BLOOD PRESSURE: 125 MMHG | DIASTOLIC BLOOD PRESSURE: 77 MMHG | WEIGHT: 278.7 LBS | HEIGHT: 65 IN | OXYGEN SATURATION: 98 % | BODY MASS INDEX: 46.44 KG/M2

## 2023-09-28 DIAGNOSIS — G47.11 IDIOPATHIC HYPERSOMNIA WITH LONG SLEEP TIME: Primary | ICD-10-CM

## 2023-09-28 DIAGNOSIS — F32.A ANXIETY AND DEPRESSION: ICD-10-CM

## 2023-09-28 DIAGNOSIS — G47.33 OSA (OBSTRUCTIVE SLEEP APNEA): ICD-10-CM

## 2023-09-28 DIAGNOSIS — Z79.899 ENCOUNTER FOR DRUG THERAPY: ICD-10-CM

## 2023-09-28 DIAGNOSIS — F41.9 ANXIETY AND DEPRESSION: ICD-10-CM

## 2023-09-28 PROCEDURE — 99214 OFFICE O/P EST MOD 30 MIN: CPT | Performed by: INTERNAL MEDICINE

## 2023-09-28 RX ORDER — DEXTROAMPHETAMINE SACCHARATE, AMPHETAMINE ASPARTATE, DEXTROAMPHETAMINE SULFATE AND AMPHETAMINE SULFATE 5; 5; 5; 5 MG/1; MG/1; MG/1; MG/1
20 TABLET ORAL 3 TIMES DAILY
Qty: 90 TABLET | Refills: 0 | Status: SHIPPED | OUTPATIENT
Start: 2023-11-27 | End: 2023-12-27

## 2023-09-28 RX ORDER — DEXTROAMPHETAMINE SACCHARATE, AMPHETAMINE ASPARTATE, DEXTROAMPHETAMINE SULFATE AND AMPHETAMINE SULFATE 5; 5; 5; 5 MG/1; MG/1; MG/1; MG/1
20 TABLET ORAL 3 TIMES DAILY
Qty: 90 TABLET | Refills: 0 | Status: SHIPPED | OUTPATIENT
Start: 2023-10-28 | End: 2023-11-27

## 2023-09-28 RX ORDER — DEXTROAMPHETAMINE SACCHARATE, AMPHETAMINE ASPARTATE, DEXTROAMPHETAMINE SULFATE AND AMPHETAMINE SULFATE 5; 5; 5; 5 MG/1; MG/1; MG/1; MG/1
20 TABLET ORAL 3 TIMES DAILY
Qty: 90 TABLET | Refills: 0 | Status: SHIPPED | OUTPATIENT
Start: 2023-09-28 | End: 2023-10-28

## 2023-09-28 ASSESSMENT — SLEEP AND FATIGUE QUESTIONNAIRES
HOW LIKELY ARE YOU TO NOD OFF OR FALL ASLEEP WHILE LYING DOWN TO REST IN THE AFTERNOON WHEN CIRCUMSTANCES PERMIT: 3
HOW LIKELY ARE YOU TO NOD OFF OR FALL ASLEEP WHILE WATCHING TV: WOULD NEVER DOZE
DO YOU GENERALLY HAVE DIFFICULTY REMEMBERING THINGS BECAUSE YOU ARE SLEEPY OR TIRED: YES, EXTREME
HOW LIKELY ARE YOU TO NOD OFF OR FALL ASLEEP IN A CAR, WHILE STOPPED FOR A FEW MINUTES IN TRAFFIC: 0
HOW LIKELY ARE YOU TO NOD OFF OR FALL ASLEEP WHEN YOU ARE A PASSENGER IN A CAR FOR AN HOUR WITHOUT A BREAK: MODERATE CHANCE OF DOZING
HOW LIKELY ARE YOU TO NOD OFF OR FALL ASLEEP WHEN YOU ARE A PASSENGER IN A CAR FOR AN HOUR WITHOUT A BREAK: 2
HAS YOUR RELATIONSHIP WITH FAMILY, FRIENDS OR WORK COLLEAGUES BEEN AFFECTED BECAUSE YOU ARE SLEEPY OR TIRED: YES, A LITTLE
FOSQ SCORE: 9
DO YOU HAVE DIFFICULTY BEING AS ACTIVE AS YOU WANT TO BE IN THE MORNING BECAUSE YOU ARE SLEEPY OR TIRED: YES, MODERATE
HOW LIKELY ARE YOU TO NOD OFF OR FALL ASLEEP WHILE SITTING AND TALKING TO SOMEONE: WOULD NEVER DOZE
DO YOU HAVE DIFFICULTY VISITING YOUR FAMILY OR FRIENDS IN THEIR HOME BECAUSE YOU BECOME SLEEPY OR TIRED: YES, A LITTLE
HOW LIKELY ARE YOU TO NOD OFF OR FALL ASLEEP WHILE SITTING AND READING: SLIGHT CHANCE OF DOZING
DO YOU HAVE DIFFICULTY BEING AS ACTIVE AS YOU WANT TO BE IN THE EVENING BECAUSE YOU ARE SLEEPY OR TIRED: YES, EXTREME
DO YOU HAVE DIFFICULTY OPERATING A MOTOR VEHICLE FOR SHORT DISTANCES (LESS THAN 100 MILES) BECAUSE YOU BECOME SLEEPY: YES, MODERATE
HOW LIKELY ARE YOU TO NOD OFF OR FALL ASLEEP WHILE SITTING INACTIVE IN A PUBLIC PLACE: WOULD NEVER DOZE
HOW LIKELY ARE YOU TO NOD OFF OR FALL ASLEEP IN A CAR, WHILE STOPPED FOR A FEW MINUTES IN TRAFFIC: WOULD NEVER DOZE
HOW LIKELY ARE YOU TO NOD OFF OR FALL ASLEEP WHILE SITTING QUIETLY AFTER LUNCH WITHOUT ALCOHOL: SLIGHT CHANCE OF DOZING
DO YOU HAVE DIFFICULTY CONCENTRATING ON THE THINGS YOU DO BECAUSE YOU ARE SLEEPY OR TIRED: YES, EXTREME
HOW LIKELY ARE YOU TO NOD OFF OR FALL ASLEEP WHILE SITTING INACTIVE IN A PUBLIC PLACE: 0
HAS YOUR MOOD BEEN AFFECTED BECAUSE YOU ARE SLEEPY OR TIRED: YES, EXTREME
ESS TOTAL SCORE: 7
HOW LIKELY ARE YOU TO NOD OFF OR FALL ASLEEP WHILE WATCHING TV: 0
HOW LIKELY ARE YOU TO NOD OFF OR FALL ASLEEP WHILE SITTING AND READING: 1
DO YOU HAVE DIFFICULTY OPERATING A MOTOR VEHICLE FOR LONG DISTANCES (GREATER THAN 100 MILES) BECAUSE YOU BECOME SLEEPY: YES, EXTREME
HOW LIKELY ARE YOU TO NOD OFF OR FALL ASLEEP WHILE LYING DOWN TO REST IN THE AFTERNOON WHEN CIRCUMSTANCES PERMIT: HIGH CHANCE OF DOZING
HOW LIKELY ARE YOU TO NOD OFF OR FALL ASLEEP WHILE SITTING AND TALKING TO SOMEONE: 0
DO YOU HAVE DIFFICULTY WATCHING A MOVIE OR VIDEO BECAUSE YOU BECOME SLEEPY OR TIRED: YES, A LITTLE
HOW LIKELY ARE YOU TO NOD OFF OR FALL ASLEEP WHILE SITTING QUIETLY AFTER LUNCH WITHOUT ALCOHOL: 1

## 2023-09-28 NOTE — PATIENT INSTRUCTIONS
[address] [facility].[place], [address]  Tel.  [phone]  Fax. [address] [facility], [address] [place] [address]  Tel.  [phone]  Fax. [phone] [facility], [address] [facility]  Tel.  [phone]  Fax. [phone]     Learning About CPAP for Sleep Apnea  What is CPAP? CPAP is a small machine that you use at home every night while you sleep. It increases air pressure in your throat to keep your airway open. When you have sleep apnea, this can help you sleep better so you feel much better. CPAP stands for \"continuous positive airway pressure. \"  The CPAP machine will have one of the following:  A mask that covers your nose and mouth  Prongs that fit into your nose  A mask that covers your nose only, the most common type. This type is called NCPAP. The N stands for \"nasal.\"  Why is it done? CPAP is usually the best treatment for obstructive sleep apnea. It is the first treatment choice and the most widely used. Your doctor may suggest CPAP if you have: Moderate to severe sleep apnea. Sleep apnea and coronary artery disease (CAD) or heart failure. How does it help? CPAP can help you have more normal sleep, so you feel less sleepy and more alert during the daytime. CPAP may help keep heart failure or other heart problems from getting worse. NCPAP may help lower your blood pressure. If you use CPAP, your bed partner may also sleep better because you are not snoring or restless. What are the side effects? Some people who use CPAP have:  A dry or stuffy nose and a sore throat. Irritated skin on the face. Sore eyes. Bloating. If you have any of these problems, work with your doctor to fix them. Here are some things you can try:  Be sure the mask or nasal prongs fit well. See if your doctor can adjust the pressure of your CPAP. If your nose is dry, try a humidifier.   If your nose is runny or stuffy, try decongestant medicine or a steroid

## 2023-10-03 LAB
AMPHET CTO UR CFM-MCNC: 2194 NG/ML
AMPHET+METHAMPHET UR QL: POSITIVE
AMPHETAMINES UR QL SCN: NORMAL NG/ML
AMPHETAMINES UR QL: POSITIVE
BARBITURATES UR QL: NEGATIVE NG/ML
BENZODIAZ UR QL SCN: NEGATIVE NG/ML
BZE UR QL: NEGATIVE NG/ML
CANNABINOIDS UR QL SCN: NEGATIVE NG/ML
CREAT UR-MCNC: 31.7 MG/DL (ref 20–300)
ETHANOL UR-MCNC: NEGATIVE %
MEPERIDINE UR QL: NEGATIVE NG/ML
METHADONE UR QL: NEGATIVE NG/ML
METHAMPHET UR QL: NEGATIVE
OPIATES UR QL SCN: NEGATIVE NG/ML
OXYCODONE+OXYMORPHONE UR QL SCN: NEGATIVE NG/ML
PCP UR QL: NEGATIVE NG/ML
PROPOXYPH UR QL: NEGATIVE NG/ML

## 2024-01-27 ASSESSMENT — SLEEP AND FATIGUE QUESTIONNAIRES
HOW LIKELY ARE YOU TO NOD OFF OR FALL ASLEEP WHEN YOU ARE A PASSENGER IN A CAR FOR AN HOUR WITHOUT A BREAK: 2
HOW LIKELY ARE YOU TO NOD OFF OR FALL ASLEEP WHILE SITTING INACTIVE IN A PUBLIC PLACE: 0
HOW LIKELY ARE YOU TO NOD OFF OR FALL ASLEEP WHILE SITTING QUIETLY AFTER LUNCH WITHOUT ALCOHOL: SLIGHT CHANCE OF DOZING
HOW LIKELY ARE YOU TO NOD OFF OR FALL ASLEEP WHILE SITTING QUIETLY AFTER LUNCH WITHOUT ALCOHOL: 1
HOW LIKELY ARE YOU TO NOD OFF OR FALL ASLEEP WHILE LYING DOWN TO REST IN THE AFTERNOON WHEN CIRCUMSTANCES PERMIT: MODERATE CHANCE OF DOZING
DO YOU HAVE DIFFICULTY OPERATING A MOTOR VEHICLE FOR SHORT DISTANCES (LESS THAN 100 MILES) BECAUSE YOU BECOME SLEEPY: NO
HOW LIKELY ARE YOU TO NOD OFF OR FALL ASLEEP WHILE LYING DOWN TO REST IN THE AFTERNOON WHEN CIRCUMSTANCES PERMIT: 2
FOSQ SCORE: 12.5
HOW LIKELY ARE YOU TO NOD OFF OR FALL ASLEEP WHILE SITTING INACTIVE IN A PUBLIC PLACE: WOULD NEVER DOZE
DO YOU HAVE DIFFICULTY BEING AS ACTIVE AS YOU WANT TO BE IN THE MORNING BECAUSE YOU ARE SLEEPY OR TIRED: YES, MODERATE
HOW LIKELY ARE YOU TO NOD OFF OR FALL ASLEEP IN A CAR, WHILE STOPPED FOR A FEW MINUTES IN TRAFFIC: 0
HAS YOUR MOOD BEEN AFFECTED BECAUSE YOU ARE SLEEPY OR TIRED: YES, EXTREME
DO YOU HAVE DIFFICULTY CONCENTRATING ON THE THINGS YOU DO BECAUSE YOU ARE SLEEPY OR TIRED: YES, MODERATE
DO YOU HAVE DIFFICULTY VISITING YOUR FAMILY OR FRIENDS IN THEIR HOME BECAUSE YOU BECOME SLEEPY OR TIRED: YES, A LITTLE
HOW LIKELY ARE YOU TO NOD OFF OR FALL ASLEEP WHILE SITTING AND READING: 1
HOW LIKELY ARE YOU TO NOD OFF OR FALL ASLEEP WHEN YOU ARE A PASSENGER IN A CAR FOR AN HOUR WITHOUT A BREAK: MODERATE CHANCE OF DOZING
DO YOU GENERALLY HAVE DIFFICULTY REMEMBERING THINGS BECAUSE YOU ARE SLEEPY OR TIRED: YES, MODERATE
HAS YOUR RELATIONSHIP WITH FAMILY, FRIENDS OR WORK COLLEAGUES BEEN AFFECTED BECAUSE YOU ARE SLEEPY OR TIRED: NO
HOW LIKELY ARE YOU TO NOD OFF OR FALL ASLEEP IN A CAR, WHILE STOPPED FOR A FEW MINUTES IN TRAFFIC: WOULD NEVER DOZE
DO YOU HAVE DIFFICULTY OPERATING A MOTOR VEHICLE FOR LONG DISTANCES (GREATER THAN 100 MILES) BECAUSE YOU BECOME SLEEPY: YES, A LITTLE
HOW LIKELY ARE YOU TO NOD OFF OR FALL ASLEEP WHILE SITTING AND READING: SLIGHT CHANCE OF DOZING
DO YOU HAVE DIFFICULTY BEING AS ACTIVE AS YOU WANT TO BE IN THE EVENING BECAUSE YOU ARE SLEEPY OR TIRED: YES, EXTREME
HOW LIKELY ARE YOU TO NOD OFF OR FALL ASLEEP WHILE SITTING AND TALKING TO SOMEONE: 0
HOW LIKELY ARE YOU TO NOD OFF OR FALL ASLEEP WHILE SITTING AND TALKING TO SOMEONE: WOULD NEVER DOZE
ESS TOTAL SCORE: 7
HOW LIKELY ARE YOU TO NOD OFF OR FALL ASLEEP WHILE WATCHING TV: 1
DO YOU HAVE DIFFICULTY WATCHING A MOVIE OR VIDEO BECAUSE YOU BECOME SLEEPY OR TIRED: YES, A LITTLE
HOW LIKELY ARE YOU TO NOD OFF OR FALL ASLEEP WHILE WATCHING TV: SLIGHT CHANCE OF DOZING

## 2024-01-30 ENCOUNTER — OFFICE VISIT (OUTPATIENT)
Age: 39
End: 2024-01-30
Payer: COMMERCIAL

## 2024-01-30 VITALS
OXYGEN SATURATION: 98 % | SYSTOLIC BLOOD PRESSURE: 122 MMHG | HEART RATE: 72 BPM | DIASTOLIC BLOOD PRESSURE: 73 MMHG | TEMPERATURE: 98.6 F | WEIGHT: 281.8 LBS | HEIGHT: 65 IN | BODY MASS INDEX: 46.95 KG/M2

## 2024-01-30 DIAGNOSIS — Z79.899 ENCOUNTER FOR DRUG THERAPY: ICD-10-CM

## 2024-01-30 DIAGNOSIS — G47.30 HYPERSOMNIA WITH SLEEP APNEA: Primary | ICD-10-CM

## 2024-01-30 DIAGNOSIS — F32.A ANXIETY AND DEPRESSION: ICD-10-CM

## 2024-01-30 DIAGNOSIS — G47.10 HYPERSOMNIA WITH SLEEP APNEA: Primary | ICD-10-CM

## 2024-01-30 DIAGNOSIS — F41.9 ANXIETY AND DEPRESSION: ICD-10-CM

## 2024-01-30 PROCEDURE — 99214 OFFICE O/P EST MOD 30 MIN: CPT | Performed by: INTERNAL MEDICINE

## 2024-01-30 RX ORDER — DEXTROAMPHETAMINE SACCHARATE, AMPHETAMINE ASPARTATE, DEXTROAMPHETAMINE SULFATE AND AMPHETAMINE SULFATE 5; 5; 5; 5 MG/1; MG/1; MG/1; MG/1
20 TABLET ORAL 2 TIMES DAILY
Qty: 60 TABLET | Refills: 0 | Status: SHIPPED | OUTPATIENT
Start: 2024-01-30 | End: 2024-02-29

## 2024-01-30 RX ORDER — SOLRIAMFETOL 75 MG/1
TABLET, FILM COATED ORAL
Qty: 30 TABLET | Refills: 5 | Status: SHIPPED | OUTPATIENT
Start: 2024-01-30

## 2024-01-30 NOTE — PROGRESS NOTES
[address], [place] [address]  Tel.  [phone]    Fax. [phone]     [address], [place] [address]  Tel.  [phone]    Fax. [phone] [address] [place] [address]  Tel.  [phone]    Fax. [phone]       Mayra Askew (: 1985) is a 38 y.o. female, established patient, seen for positive airway pressure follow-up and evaluation of the following chief complaint(s):   Sleep Problem (3 Month Medication F/U and 4 month follow up. Brought device. DL in chart . )       Mayra Askew was last seen by me on 24, prior notes reviewed in detail.        PSG(22): Apnea/Hypopnea index of 3.1 which indicates no signifiant sleep apnea.   HSAT(23): Indicative of an average AHI of 6.0 per hour with an SpO2 sherlyn of 89% and SpO2 of < 88% being 0.00 minutes. PAP device was prescribed.     MSLT(22): Moderate daytime sleepiness as evidenced by an average latency of 7:00 minutes, Stage REM was not noted in any of  the naps.      Urine Drug Screen (22): Negative.  Urine Drug Screen (23): Negative.  Urine Drug Screen (23): Positive for amphetamines.  Urine Drug Screen (23): Positive for amphetamines.    ASSESSMENT/PLAN:  Encounter Diagnoses   Name Primary?    Hypersomnia with sleep apnea Yes    Anxiety and depression     Encounter for drug therapy     BMI 45.0-49.9, adult (HCC)        On ResMed:  AirSense 10 AutoSet    Settings:  Min EPAP: 06 cmH2O  Max EPAP: 15 cmH2O    EPR: Off    Hypersomnia with Sleep Apnea -   * She is not adherent with PAP therapy and is experiencing pressure tolerance issues. She would like to return for a trial of Bi-Level PAP therapy due to CPAP failure.      Orders Placed This Encounter    SLEEP LAB (PAP TITRATION)     Standing Status:   Future     Standing Expiration Date:   2025     Scheduling Instructions:      Perform Bi-Level titration.

## 2024-01-30 NOTE — PATIENT INSTRUCTIONS
[address]  [place] [address]  Tel.  [phone]  Fax. [phone] [address]  [place] [address]  Tel.  [phone]  Fax. [phone] [address] [place] [address]  Tel.  [phone]  Fax. [phone]     Learning About CPAP for Sleep Apnea  What is CPAP?              CPAP is a small machine that you use at home every night while you sleep. It increases air pressure in your throat to keep your airway open. When you have sleep apnea, this can help you sleep better so you feel much better. CPAP stands for \"continuous positive airway pressure.\"  The CPAP machine will have one of the following:  A mask that covers your nose and mouth  Prongs that fit into your nose  A mask that covers your nose only, the most common type. This type is called NCPAP. The N stands for \"nasal.\"  Why is it done?  CPAP is usually the best treatment for obstructive sleep apnea. It is the first treatment choice and the most widely used. Your doctor may suggest CPAP if you have:  Moderate to severe sleep apnea.  Sleep apnea and coronary artery disease (CAD) or heart failure.  How does it help?  CPAP can help you have more normal sleep, so you feel less sleepy and more alert during the daytime.  CPAP may help keep heart failure or other heart problems from getting worse.  NCPAP may help lower your blood pressure.  If you use CPAP, your bed partner may also sleep better because you are not snoring or restless.  What are the side effects?  Some people who use CPAP have:  A dry or stuffy nose and a sore throat.  Irritated skin on the face.  Sore eyes.  Bloating.  If you have any of these problems, work with your doctor to fix them. Here are some things you can try:  Be sure the mask or nasal prongs fit well.  See if your doctor can adjust the pressure of your CPAP.  If your nose is dry, try a humidifier.  If your nose is runny or stuffy, try decongestant medicine or a steroid

## 2024-02-21 ENCOUNTER — HOSPITAL ENCOUNTER (OUTPATIENT)
Facility: HOSPITAL | Age: 39
Discharge: HOME | End: 2024-02-24
Payer: COMMERCIAL

## 2024-02-21 VITALS
TEMPERATURE: 98.2 F | BODY MASS INDEX: 46.82 KG/M2 | HEART RATE: 84 BPM | WEIGHT: 281 LBS | OXYGEN SATURATION: 99 % | HEIGHT: 65 IN

## 2024-02-21 DIAGNOSIS — G47.30 HYPERSOMNIA WITH SLEEP APNEA: ICD-10-CM

## 2024-02-21 DIAGNOSIS — G47.10 HYPERSOMNIA WITH SLEEP APNEA: ICD-10-CM

## 2024-02-21 PROCEDURE — 95811 POLYSOM 6/>YRS CPAP 4/> PARM: CPT | Performed by: INTERNAL MEDICINE

## 2024-02-22 ENCOUNTER — TELEPHONE (OUTPATIENT)
Age: 39
End: 2024-02-22

## 2024-02-22 DIAGNOSIS — G47.33 OSA (OBSTRUCTIVE SLEEP APNEA): Primary | ICD-10-CM

## 2024-02-22 NOTE — PROGRESS NOTES
Sleep Study Technical Notes   Disclaimer:  all notes have not been confirmed by interpreting physician      PRE-Test:  Mayra Askew (: 1985) arrived in the lobby. The patient was taken to the Sleep Center and taken directly to his/her room.   Procedure explained to the patient and questions were answered. The patient expressed understanding of the procedure. Electrodes were applied without incident. The patient was placed in bed and the study was started.    Acquisition Notes:  Lights off: 9:44pm     Respiratory events:   A__N    H__N  C__N  M__N  ECG:    Significant arrhythmias:   N  Snoring:  mild   Sleep Stages:  REM   Y    Titration type:BiPap   PAP titration information in study flow sheet if applicable  Other comments:   Patient to bathroom 0 times      POST Test:  Patient was awakened.  Electrodes were removed.    The patient was discharged after answering the Post Questionnaire.    Equipment and room cleaned per infection control policy.

## 2024-02-29 NOTE — TELEPHONE ENCOUNTER
PAP order with last sleep progress notes and titration study report forwarded to [facility].  Patient notified through [employer] messaging.

## 2024-02-29 NOTE — TELEPHONE ENCOUNTER
Polysomnography with PAP titration interpreted, device ordered.    Encounter Diagnosis   Name Primary?    DC (obstructive sleep apnea) Yes         Orders Placed This Encounter   Procedures    DME Order for (Specify) as OP     - DME device ordered - ResMed Device with Heated Humidifer  / .   - Diagnosis: Obstructive Sleep Apnea (G47.33)  - Length of Need: Lifetime      ResMed VPAP Auto (VAuto Mode): Maximum IPAP: 15 cmH2O; Minimum EPAP: 06 cmH2O; PS: 06 cmH2O.  Ramp Time: 30 Minutes.     CPAP mask -  As fitted during titration OR patient preference, headgear, tubing, and filter;  heated humidifier; wireless modem. Remote monitoring enrollment.    Santi Crowell MD, FAASM; NPI: [id number]  2/29/2024

## 2024-05-10 ENCOUNTER — TELEPHONE (OUTPATIENT)
Age: 39
End: 2024-05-10

## 2024-05-10 ENCOUNTER — CLINICAL DOCUMENTATION (OUTPATIENT)
Age: 39
End: 2024-05-10

## 2024-05-10 NOTE — TELEPHONE ENCOUNTER
Prior-authorization request for Sunosi 75MG Tablet received via Cover-My-Meds. As patient is due for 1st adherence and medication follow up, I called patient to schedule appointment. Follow up appointment scheduled. During call the patient informed that:    She has not been taking Sunosi as the pharmacy is waiting on prior authorization.     No request for prior authorization found other than one received on 5/8. I have submitted the prior authorization request via Cover-My-Meds.     The patient also informed that she has not been taking the Adderall 20MG tablet since 02/2024 as the pharmacy had stated that they are waiting on new prescription. No request from patient or pharmacy for this medication found.

## 2024-05-10 NOTE — PROGRESS NOTES
[employer] Sunosi 75MG Tablet Prior-Authorization initiated and submitted via Cover-My-Meds. Cover-My-Meds Key Code: [id number].

## 2024-05-28 ENCOUNTER — OFFICE VISIT (OUTPATIENT)
Age: 39
End: 2024-05-28
Payer: COMMERCIAL

## 2024-05-28 ENCOUNTER — CLINICAL DOCUMENTATION (OUTPATIENT)
Age: 39
End: 2024-05-28

## 2024-05-28 VITALS
HEIGHT: 65 IN | BODY MASS INDEX: 48.82 KG/M2 | WEIGHT: 293 LBS | SYSTOLIC BLOOD PRESSURE: 130 MMHG | DIASTOLIC BLOOD PRESSURE: 90 MMHG | TEMPERATURE: 98.1 F | OXYGEN SATURATION: 97 % | HEART RATE: 73 BPM

## 2024-05-28 DIAGNOSIS — F32.A ANXIETY AND DEPRESSION: ICD-10-CM

## 2024-05-28 DIAGNOSIS — G47.10 HYPERSOMNIA WITH SLEEP APNEA: Primary | ICD-10-CM

## 2024-05-28 DIAGNOSIS — Z79.899 ENCOUNTER FOR DRUG THERAPY: ICD-10-CM

## 2024-05-28 DIAGNOSIS — F41.9 ANXIETY AND DEPRESSION: ICD-10-CM

## 2024-05-28 DIAGNOSIS — G47.30 HYPERSOMNIA WITH SLEEP APNEA: Primary | ICD-10-CM

## 2024-05-28 PROCEDURE — 99214 OFFICE O/P EST MOD 30 MIN: CPT | Performed by: INTERNAL MEDICINE

## 2024-05-28 RX ORDER — MODAFINIL 200 MG/1
200 TABLET ORAL DAILY
Qty: 30 TABLET | Refills: 5 | Status: SHIPPED | OUTPATIENT
Start: 2024-05-28 | End: 2024-11-24

## 2024-05-28 ASSESSMENT — SLEEP AND FATIGUE QUESTIONNAIRES
HOW LIKELY ARE YOU TO NOD OFF OR FALL ASLEEP WHILE WATCHING TV: SLIGHT CHANCE OF DOZING
HOW LIKELY ARE YOU TO NOD OFF OR FALL ASLEEP WHILE SITTING AND TALKING TO SOMEONE: WOULD NEVER DOZE
HOW LIKELY ARE YOU TO NOD OFF OR FALL ASLEEP IN A CAR, WHILE STOPPED FOR A FEW MINUTES IN TRAFFIC: SLIGHT CHANCE OF DOZING
HOW LIKELY ARE YOU TO NOD OFF OR FALL ASLEEP WHILE SITTING AND READING: SLIGHT CHANCE OF DOZING
HOW LIKELY ARE YOU TO NOD OFF OR FALL ASLEEP WHILE SITTING INACTIVE IN A PUBLIC PLACE: SLIGHT CHANCE OF DOZING
ESS TOTAL SCORE: 9
HOW LIKELY ARE YOU TO NOD OFF OR FALL ASLEEP WHILE LYING DOWN TO REST IN THE AFTERNOON WHEN CIRCUMSTANCES PERMIT: HIGH CHANCE OF DOZING
HOW LIKELY ARE YOU TO NOD OFF OR FALL ASLEEP WHEN YOU ARE A PASSENGER IN A CAR FOR AN HOUR WITHOUT A BREAK: SLIGHT CHANCE OF DOZING
HOW LIKELY ARE YOU TO NOD OFF OR FALL ASLEEP WHILE SITTING QUIETLY AFTER LUNCH WITHOUT ALCOHOL: SLIGHT CHANCE OF DOZING

## 2024-05-28 NOTE — PROGRESS NOTES
[address] [address]  Tel.  [phone]    Fax. [phone]     [address] [address]  Tel.  [phone]    Fax. [phone] [address] [place] [address]  Tel.  [phone]    Fax. [phone]       Mayra Askew (: 1985) is a 39 y.o. female, established patient, seen for positive airway pressure follow-up and evaluation of the following chief complaint(s):   Sleep Problem (1st Adherence Medication follow up )       Mayra Askew was last seen by me on 24, prior notes reviewed in detail.      PSG(22): Apnea/Hypopnea index of 3.1 which indicates no signifiant sleep apnea.   HSAT(23): Indicative of an average AHI of 6.0 per hour with an SpO2 sherlyn of 89% and SpO2 of < 88% being 0.00 minutes. PAP device was prescribed.     MSLT(22): Moderate daytime sleepiness as evidenced by an average latency of 7:00 minutes, Stage REM was not noted in any of  the naps.      Urine Drug Screen (22): Negative.  Urine Drug Screen (23): Negative.  Urine Drug Screen (23): Positive for amphetamines.    ASSESSMENT/PLAN:   Diagnosis Orders   1. Hypersomnia with sleep apnea  modafinil (PROVIGIL) 200 MG tablet      2. Encounter for drug therapy        3. Anxiety and depression        4. BMI 45.0-49.9, adult (HCC)            On ResMed:  AirCurve 10 VAuto:    Settings:  Mode - VAuto    Max IPAP: 15 cmH2O    Min EPAP: 06 cmH2O    PS: 06 cmH2O    Sleep Apnea -   * She is adherent with PAP therapy and PAP continues to benefit patient and remains necessary for control of her sleep apnea. Continue on current pressures, explained to patient that her therapy is working very well for her at this time.  She is not using her device as much as she needs to and perhaps this is the reason why she remains so sleepy.  It she was encouraged to use her device regularly each night for the duration of the

## 2024-05-28 NOTE — PATIENT INSTRUCTIONS
[address]  [place] [address]  Tel.  [phone]  Fax. [phone] [address]  [place] [address]  Tel.  [phone]  Fax. [phone] [address] [place] [address]  Tel.  [phone]  Fax. [phone]     Learning About CPAP for Sleep Apnea  What is CPAP?              CPAP is a small machine that you use at home every night while you sleep. It increases air pressure in your throat to keep your airway open. When you have sleep apnea, this can help you sleep better so you feel much better. CPAP stands for \"continuous positive airway pressure.\"  The CPAP machine will have one of the following:  A mask that covers your nose and mouth  Prongs that fit into your nose  A mask that covers your nose only, the most common type. This type is called NCPAP. The N stands for \"nasal.\"  Why is it done?  CPAP is usually the best treatment for obstructive sleep apnea. It is the first treatment choice and the most widely used. Your doctor may suggest CPAP if you have:  Moderate to severe sleep apnea.  Sleep apnea and coronary artery disease (CAD) or heart failure.  How does it help?  CPAP can help you have more normal sleep, so you feel less sleepy and more alert during the daytime.  CPAP may help keep heart failure or other heart problems from getting worse.  NCPAP may help lower your blood pressure.  If you use CPAP, your bed partner may also sleep better because you are not snoring or restless.  What are the side effects?  Some people who use CPAP have:  A dry or stuffy nose and a sore throat.  Irritated skin on the face.  Sore eyes.  Bloating.  If you have any of these problems, work with your doctor to fix them. Here are some things you can try:  Be sure the mask or nasal prongs fit well.  See if your doctor can adjust the pressure of your CPAP.  If your nose is dry, try a humidifier.  If your nose is runny or stuffy, try decongestant medicine or a steroid

## 2024-05-31 ENCOUNTER — CLINICAL DOCUMENTATION (OUTPATIENT)
Age: 39
End: 2024-05-31

## 2024-05-31 NOTE — PROGRESS NOTES
[employer] Modafinil 200MG Tablet Prior Authorization initiated and submitted via CoverMyMeds. Cover-My-Meds Key: [id number]. PA Case ID #: [id number] Rx #: [id number].

## 2024-06-03 ENCOUNTER — TELEPHONE (OUTPATIENT)
Age: 39
End: 2024-06-03

## 2024-06-03 ENCOUNTER — CLINICAL DOCUMENTATION (OUTPATIENT)
Age: 39
End: 2024-06-03

## 2024-06-03 NOTE — PROGRESS NOTES
[employer]BS Modafinil 200MG Tablet Prior Authorization Denied by plan. Cover-My-Meds Key: [id number]. PA Case ID #: [id number] Rx #: [id number].     Peer to peer available by calling [phone].    Submitted clinical information and denial letter scanned into patient media.

## 2024-06-03 NOTE — TELEPHONE ENCOUNTER
[employer]BS Modafinil 200MG Tablet Prior Authorization Denied by plan. Cover-My-Meds Key: [id number]. PA Case ID #: [id number] Rx #: [id number].      Peer to peer available by calling [phone].     Submitted clinical information and denial letter scanned into patient media.     Please advise.

## 2024-08-29 ENCOUNTER — TELEPHONE (OUTPATIENT)
Age: 39
End: 2024-08-29

## 2024-08-29 DIAGNOSIS — G47.30 HYPERSOMNIA WITH SLEEP APNEA: Primary | ICD-10-CM

## 2024-08-29 DIAGNOSIS — G47.10 HYPERSOMNIA WITH SLEEP APNEA: Primary | ICD-10-CM

## 2024-08-29 RX ORDER — ARMODAFINIL 200 MG/1
200 TABLET ORAL
Qty: 30 TABLET | Refills: 5 | Status: SHIPPED | OUTPATIENT
Start: 2024-08-29 | End: 2025-02-25

## 2024-08-29 NOTE — TELEPHONE ENCOUNTER
Orders Placed This Encounter    Armodafinil 200 MG TABS     Sig: Take 200 mg by mouth daily (with breakfast) for 180 days. Max Daily Amount: 200 mg     Dispense:  30 tablet     Refill:  5

## 2024-08-29 NOTE — TELEPHONE ENCOUNTER
Patient called into the office to discuss medication denial. I informed the patient again that due to non compliance with PAP therapy, excessive daytime sleepiness caused by DC can not be ruled out, and as such insurance will not cover cost of medication.     Importance of using device every night to treat DC conveyed to patient. I instructed the patient to contact [facility] if she continues to experience difficulties with PAP therapy to schedule appointment to discuss alternative treatment options.     I informed the patient that [employer] is a program that may be able to provide assistance. [employer] price for Armodafinil showing as $33.12 dollars.     Patient requested that prescription be re-sent to the [facility] pharmacy located at:    [address] [place] [id number]    (Pharmacy added to patient's demographics)    Patient states that this pharmacy will be easier to  from.

## 2024-09-30 NOTE — PROGRESS NOTES
Called and spoke with pt regarding recent hospital visit.    Pt stated she is currently taking Metoprolol Succinate ER 50 mg.    Metoprolol-Hydrochlorothiazide 50-12.5 mg is listed on pt current medication list.    Can PCP clarify which BP prescription pt should be taking.    If new prescription needs to be sent in [facility]      Transitions of Care Outreach  Chief Complaint   Patient presents with    Hospital F/U       Most Recent Admission Date: 9/29/2024   Most Recent Admission Diagnosis:      Most Recent Discharge Date: 9/29/2024   Most Recent Discharge Diagnosis: Chest pain, unspecified type - R07.9     Transitions of Care Assessment    Discharge Assessment  How are you doing now that you are home?: Pt is feeling good  How are your symptoms? (Red Flag symptoms escalate to triage hotline per guidelines): Improved  Do you know how to contact your clinic care team if you have future questions or changes to your health status? : Yes  Does the patient have their discharge instructions? : Yes  Does the patient have questions regarding their discharge instructions? : No  Were you started on any new medications or were there changes to any of your previous medications? : No  Does the patient have all of their medications?: Yes  Do you have questions regarding any of your medications? : Yes (see comment) (BP medication.)    Follow up Plan          Future Appointments   Date Time Provider Department Center   10/9/2024  9:50 AM Lana Chaudhari MD [facility]   10/15/2024  3:00 PM [facility]       Outpatient Plan as outlined on AVS reviewed with patient.    For any urgent concerns, please contact our 24 hour nurse triage line: [phone] ([phone])       Cherie Robles RN                   Transition of Care Plan:  RUR: n/a obs status   Disposition: home with   Follow up appointments: ortho   DME needed: RW- referral sent to [employer]  Transportation at Discharge:    [place] or means to access home:  to provide        IM Medicare Letter: n/a commercial insurance   Is patient a BCPI-A Bundle: no           If yes, was Bundle Letter given?:    Is patient a  and connected with the [facility]?no  If yes, was [employer] transfer form completed and VA notified? Caregiver Contact:  Blaise Pickard [phone]  Discharge Caregiver contacted prior to discharge? To be contacted prior to d/c     Update  CM unable to secure [facility] services due to pt's location, insurance, and staffing shortages at multiple agencies. CM informed ortho PA. RW approved through [employer] and provided to patient at bedside. Reason for Admission: right ankle fracture                     RUR Score: n/a obs status                  Plan for utilizing home health: per recommendation          PCP: First and Last name:  Zunilda Vo MD   Name of Practice: [facility]    Are you a current patient: Yes/No: yes   Approximate date of last visit: within 1 year   Can you participate in a virtual visit with your PCP: yes                    Current Advanced Directive/Advance Care Plan: patient reports she is currently working on one at [address] [place] [address]:   Click here to [address] including selection of the Healthcare Decision Maker Relationship (ie \"Primary\")                        Transition of Care Plan:                      CM made room visit with patient who was alert and oriented. Pt confirmed demographics, insurance, and emergency contact on file. Pt uses [employer] in [place]. Pt, , and 2 children (3 y/o and 1 y/o) live in a single level home with [address] [facility]. Pt has axillary crutches, wheelchair, bedside commode. Pt will need a RW prior to d/c.  At baseline pt is independent with ADLs and driving. Pt has no hx of HH, SNF, or IPR. Pt's plan is to d/c home with home health. Pt has no preference in [facility]. Multiple referrals sent out via [email or web address]. Pt's  to provide transportation at d/c. Care Management Interventions  PCP Verified by CM: Yes  Mode of Transport at Discharge:  Other (see comment) ( )  Transition of Care Consult (CM Consult): Discharge [address]: No  Reason Outside [place]: Out of service area  Discharge Durable Medical Equipment: Yes  Physical Therapy Consult: Yes  Occupational Therapy Consult: Yes  Speech Therapy Consult: No  Support Systems: Spouse/Significant Other  Confirm Follow Up Transport: Family  Discharge Location  Discharge Placement: Home with home health    Justina Baker, [address]

## 2024-10-24 ENCOUNTER — OFFICE VISIT (OUTPATIENT)
Age: 39
End: 2024-10-24
Payer: COMMERCIAL

## 2024-10-24 VITALS
BODY MASS INDEX: 48.82 KG/M2 | DIASTOLIC BLOOD PRESSURE: 80 MMHG | HEART RATE: 71 BPM | HEIGHT: 65 IN | OXYGEN SATURATION: 97 % | WEIGHT: 293 LBS | TEMPERATURE: 98.2 F | SYSTOLIC BLOOD PRESSURE: 125 MMHG

## 2024-10-24 DIAGNOSIS — G47.30 HYPERSOMNIA WITH SLEEP APNEA: Primary | ICD-10-CM

## 2024-10-24 DIAGNOSIS — G47.10 HYPERSOMNIA WITH SLEEP APNEA: Primary | ICD-10-CM

## 2024-10-24 DIAGNOSIS — F32.A ANXIETY AND DEPRESSION: ICD-10-CM

## 2024-10-24 DIAGNOSIS — F41.9 ANXIETY AND DEPRESSION: ICD-10-CM

## 2024-10-24 DIAGNOSIS — Z79.899 MEDICATION MANAGEMENT: ICD-10-CM

## 2024-10-24 PROCEDURE — 99214 OFFICE O/P EST MOD 30 MIN: CPT | Performed by: INTERNAL MEDICINE

## 2024-10-24 RX ORDER — ARMODAFINIL 200 MG/1
200 TABLET ORAL
Qty: 90 TABLET | Refills: 1 | Status: SHIPPED | OUTPATIENT
Start: 2024-10-24 | End: 2025-04-22

## 2024-10-24 ASSESSMENT — SLEEP AND FATIGUE QUESTIONNAIRES
HOW LIKELY ARE YOU TO NOD OFF OR FALL ASLEEP WHEN YOU ARE A PASSENGER IN A CAR FOR AN HOUR WITHOUT A BREAK: MODERATE CHANCE OF DOZING
HOW LIKELY ARE YOU TO NOD OFF OR FALL ASLEEP WHILE LYING DOWN TO REST IN THE AFTERNOON WHEN CIRCUMSTANCES PERMIT: HIGH CHANCE OF DOZING
HOW LIKELY ARE YOU TO NOD OFF OR FALL ASLEEP WHILE SITTING QUIETLY AFTER LUNCH WITHOUT ALCOHOL: SLIGHT CHANCE OF DOZING
HOW LIKELY ARE YOU TO NOD OFF OR FALL ASLEEP IN A CAR, WHILE STOPPED FOR A FEW MINUTES IN TRAFFIC: WOULD NEVER DOZE
HOW LIKELY ARE YOU TO NOD OFF OR FALL ASLEEP WHILE SITTING INACTIVE IN A PUBLIC PLACE: SLIGHT CHANCE OF DOZING
HOW LIKELY ARE YOU TO NOD OFF OR FALL ASLEEP WHILE SITTING AND TALKING TO SOMEONE: WOULD NEVER DOZE
HOW LIKELY ARE YOU TO NOD OFF OR FALL ASLEEP WHILE SITTING AND READING: SLIGHT CHANCE OF DOZING
ESS TOTAL SCORE: 9
HOW LIKELY ARE YOU TO NOD OFF OR FALL ASLEEP WHILE WATCHING TV: SLIGHT CHANCE OF DOZING

## 2024-10-24 NOTE — PATIENT INSTRUCTIONS
[address]  [place] [address]  Tel.  [phone]  Fax. [phone] [address]  [place] [address]  Tel.  [phone]  Fax. [phone] [address] [place] [address]  Tel.  [phone]  Fax. [phone]     Learning About CPAP for Sleep Apnea  What is CPAP?              CPAP is a small machine that you use at home every night while you sleep. It increases air pressure in your throat to keep your airway open. When you have sleep apnea, this can help you sleep better so you feel much better. CPAP stands for \"continuous positive airway pressure.\"  The CPAP machine will have one of the following:  A mask that covers your nose and mouth  Prongs that fit into your nose  A mask that covers your nose only, the most common type. This type is called NCPAP. The N stands for \"nasal.\"  Why is it done?  CPAP is usually the best treatment for obstructive sleep apnea. It is the first treatment choice and the most widely used. Your doctor may suggest CPAP if you have:  Moderate to severe sleep apnea.  Sleep apnea and coronary artery disease (CAD) or heart failure.  How does it help?  CPAP can help you have more normal sleep, so you feel less sleepy and more alert during the daytime.  CPAP may help keep heart failure or other heart problems from getting worse.  NCPAP may help lower your blood pressure.  If you use CPAP, your bed partner may also sleep better because you are not snoring or restless.  What are the side effects?  Some people who use CPAP have:  A dry or stuffy nose and a sore throat.  Irritated skin on the face.  Sore eyes.  Bloating.  If you have any of these problems, work with your doctor to fix them. Here are some things you can try:  Be sure the mask or nasal prongs fit well.  See if your doctor can adjust the pressure of your CPAP.  If your nose is dry, try a humidifier.  If your nose is runny or stuffy, try decongestant medicine or a steroid

## 2024-10-24 NOTE — PROGRESS NOTES
[address], [place] [address]  Tel.  [phone]    Fax. [phone]     [address] [address]  Tel.  [phone]    Fax. [phone] [address] [place] [address]  Tel.  [phone]    Fax. [phone]       Mayra Askew (: 1985) is a 39 y.o. female, established patient, seen for positive airway pressure follow-up and evaluation of the following chief complaint(s):   Sleep Problem (5 month F/U)       Mayra Askew was last seen by me on 24, prior notes reviewed in detail.      PSG(22): Apnea/Hypopnea index of 3.1 which indicates no signifiant sleep apnea.   HSAT(23): Indicative of an average AHI of 6.0 per hour with an SpO2 sherlyn of 89% and SpO2 of < 88% being 0.00 minutes. PAP device was prescribed.     MSLT(22): Moderate daytime sleepiness as evidenced by an average latency of 7:00 minutes, Stage REM was not noted in any of  the naps.      Urine Drug Screen (22): Negative.  Urine Drug Screen (23): Negative.  Urine Drug Screen (23): Positive for amphetamines.    ASSESSMENT/PLAN:   Diagnosis Orders   1. Hypersomnia with sleep apnea  Armodafinil 200 MG TABS      2. Medication management  12-Drug Screen W/Conf, Urine      3. Anxiety and depression        4. BMI 50.0-59.9, adult            On ResMed:  AirCurve 10 VAuto:     Settings:  Mode - VAuto     Max IPAP: 15 cmH2O     Min EPAP: 06 cmH2O     PS: 06 cmH2O    Sleep Apnea -   * She is adherent with PAP therapy and PAP continues to benefit patient and remains necessary for control of her sleep apnea. Continue on current pressures    * Supplies for his device were ordered as noted below:    Orders Placed This Encounter    12-Drug Screen W/Conf, Urine     Standing Status:   Future     Standing Expiration Date:   10/24/2025    Armodafinil 200 MG TABS     Sig: Take 200 mg by mouth daily (with breakfast) for 180 days. Max

## 2024-10-25 LAB
AMPHETAMINES UR QL SCN: NEGATIVE NG/ML
BARBITURATES UR QL: NEGATIVE NG/ML
BENZODIAZ UR QL SCN: NEGATIVE NG/ML
BZE UR QL: NEGATIVE NG/ML
CANNABINOIDS UR QL SCN: NEGATIVE NG/ML
CREAT UR-MCNC: 57.1 MG/DL (ref 20–300)
ETHANOL UR-MCNC: NEGATIVE %
MEPERIDINE UR QL: NEGATIVE NG/ML
METHADONE UR QL: NEGATIVE NG/ML
OPIATES UR QL SCN: NEGATIVE NG/ML
OXYCODONE+OXYMORPHONE UR QL SCN: NEGATIVE NG/ML
PCP UR QL: NEGATIVE NG/ML
PROPOXYPH UR QL: NEGATIVE NG/ML

## 2025-04-15 ASSESSMENT — SLEEP AND FATIGUE QUESTIONNAIRES
DO YOU HAVE DIFFICULTY OPERATING A MOTOR VEHICLE FOR LONG DISTANCES (GREATER THAN 100 MILES) BECAUSE YOU BECOME SLEEPY: YES, A LITTLE
DO YOU GENERALLY HAVE DIFFICULTY REMEMBERING THINGS BECAUSE YOU ARE SLEEPY OR TIRED: YES, A LITTLE
HOW LIKELY ARE YOU TO NOD OFF OR FALL ASLEEP WHILE WATCHING TV: WOULD NEVER DOZE
HOW LIKELY ARE YOU TO NOD OFF OR FALL ASLEEP WHILE LYING DOWN TO REST IN THE AFTERNOON WHEN CIRCUMSTANCES PERMIT: HIGH CHANCE OF DOZING
HOW LIKELY ARE YOU TO NOD OFF OR FALL ASLEEP WHILE SITTING QUIETLY AFTER LUNCH WITHOUT ALCOHOL: SLIGHT CHANCE OF DOZING
HOW LIKELY ARE YOU TO NOD OFF OR FALL ASLEEP WHILE SITTING AND TALKING TO SOMEONE: WOULD NEVER DOZE
ESS TOTAL SCORE: 7
HOW LIKELY ARE YOU TO NOD OFF OR FALL ASLEEP WHEN YOU ARE A PASSENGER IN A CAR FOR AN HOUR WITHOUT A BREAK: SLIGHT CHANCE OF DOZING
HOW LIKELY ARE YOU TO NOD OFF OR FALL ASLEEP IN A CAR, WHILE STOPPED FOR A FEW MINUTES IN TRAFFIC: WOULD NEVER DOZE
DO YOU HAVE DIFFICULTY OPERATING A MOTOR VEHICLE FOR SHORT DISTANCES (LESS THAN 100 MILES) BECAUSE YOU BECOME SLEEPY: YES, A LITTLE
HOW LIKELY ARE YOU TO NOD OFF OR FALL ASLEEP WHILE SITTING INACTIVE IN A PUBLIC PLACE: SLIGHT CHANCE OF DOZING
HOW LIKELY ARE YOU TO NOD OFF OR FALL ASLEEP WHILE LYING DOWN TO REST IN THE AFTERNOON WHEN CIRCUMSTANCES PERMIT: HIGH CHANCE OF DOZING
HOW LIKELY ARE YOU TO NOD OFF OR FALL ASLEEP WHILE SITTING QUIETLY AFTER LUNCH WITHOUT ALCOHOL: SLIGHT CHANCE OF DOZING
HOW LIKELY ARE YOU TO NOD OFF OR FALL ASLEEP WHILE SITTING AND TALKING TO SOMEONE: WOULD NEVER DOZE
DO YOU HAVE DIFFICULTY BEING AS ACTIVE AS YOU WANT TO BE IN THE EVENING BECAUSE YOU ARE SLEEPY OR TIRED: YES, MODERATE
DO YOU HAVE DIFFICULTY BEING AS ACTIVE AS YOU WANT TO BE IN THE MORNING BECAUSE YOU ARE SLEEPY OR TIRED: YES, LITTLE
DO YOU HAVE DIFFICULTY WATCHING A MOVIE OR VIDEO BECAUSE YOU BECOME SLEEPY OR TIRED: YES, A LITTLE
FOSQ SCORE: 14
HOW LIKELY ARE YOU TO NOD OFF OR FALL ASLEEP IN A CAR, WHILE STOPPED FOR A FEW MINUTES IN TRAFFIC: WOULD NEVER DOZE
HOW LIKELY ARE YOU TO NOD OFF OR FALL ASLEEP WHILE SITTING AND READING: SLIGHT CHANCE OF DOZING
HAS YOUR MOOD BEEN AFFECTED BECAUSE YOU ARE SLEEPY OR TIRED: YES, EXTREME
DO YOU HAVE DIFFICULTY VISITING YOUR FAMILY OR FRIENDS IN THEIR HOME BECAUSE YOU BECOME SLEEPY OR TIRED: YES, A LITTLE
HOW LIKELY ARE YOU TO NOD OFF OR FALL ASLEEP WHILE WATCHING TV: WOULD NEVER DOZE
HOW LIKELY ARE YOU TO NOD OFF OR FALL ASLEEP WHEN YOU ARE A PASSENGER IN A CAR FOR AN HOUR WITHOUT A BREAK: SLIGHT CHANCE OF DOZING
HOW LIKELY ARE YOU TO NOD OFF OR FALL ASLEEP WHILE SITTING AND READING: SLIGHT CHANCE OF DOZING
HAS YOUR RELATIONSHIP WITH FAMILY, FRIENDS OR WORK COLLEAGUES BEEN AFFECTED BECAUSE YOU ARE SLEEPY OR TIRED: NO
HOW LIKELY ARE YOU TO NOD OFF OR FALL ASLEEP WHILE SITTING INACTIVE IN A PUBLIC PLACE: SLIGHT CHANCE OF DOZING
DO YOU HAVE DIFFICULTY CONCENTRATING ON THE THINGS YOU DO BECAUSE YOU ARE SLEEPY OR TIRED: YES, A LITTLE

## 2025-04-17 ENCOUNTER — CLINICAL DOCUMENTATION (OUTPATIENT)
Age: 40
End: 2025-04-17

## 2025-04-17 ENCOUNTER — OFFICE VISIT (OUTPATIENT)
Age: 40
End: 2025-04-17
Payer: COMMERCIAL

## 2025-04-17 VITALS
HEIGHT: 65 IN | HEART RATE: 71 BPM | DIASTOLIC BLOOD PRESSURE: 92 MMHG | BODY MASS INDEX: 48.82 KG/M2 | SYSTOLIC BLOOD PRESSURE: 139 MMHG | TEMPERATURE: 98.2 F | OXYGEN SATURATION: 98 % | WEIGHT: 293 LBS

## 2025-04-17 DIAGNOSIS — G47.30 HYPERSOMNIA WITH SLEEP APNEA: Primary | ICD-10-CM

## 2025-04-17 DIAGNOSIS — G47.10 HYPERSOMNIA WITH SLEEP APNEA: Primary | ICD-10-CM

## 2025-04-17 DIAGNOSIS — F32.A ANXIETY AND DEPRESSION: ICD-10-CM

## 2025-04-17 DIAGNOSIS — R03.0 ELEVATED BLOOD PRESSURE READING IN OFFICE WITHOUT DIAGNOSIS OF HYPERTENSION: ICD-10-CM

## 2025-04-17 DIAGNOSIS — F41.9 ANXIETY AND DEPRESSION: ICD-10-CM

## 2025-04-17 DIAGNOSIS — Z79.899 MEDICATION MANAGEMENT: ICD-10-CM

## 2025-04-17 PROCEDURE — 99214 OFFICE O/P EST MOD 30 MIN: CPT | Performed by: INTERNAL MEDICINE

## 2025-04-17 RX ORDER — ARMODAFINIL 200 MG/1
200 TABLET ORAL
Qty: 90 TABLET | Refills: 1 | Status: SHIPPED | OUTPATIENT
Start: 2025-04-17 | End: 2025-10-14

## 2025-04-17 NOTE — PATIENT INSTRUCTIONS
[address]  [place] [address]  Tel.  [phone]  Fax. [phone] [address]  [place] [address]  Tel.  [phone]  Fax. [phone] [address] [place] [address]  Tel.  [phone]  Fax. [phone]     Learning About CPAP for Sleep Apnea  What is CPAP?              CPAP is a small machine that you use at home every night while you sleep. It increases air pressure in your throat to keep your airway open. When you have sleep apnea, this can help you sleep better so you feel much better. CPAP stands for \"continuous positive airway pressure.\"  The CPAP machine will have one of the following:  A mask that covers your nose and mouth  Prongs that fit into your nose  A mask that covers your nose only, the most common type. This type is called NCPAP. The N stands for \"nasal.\"  Why is it done?  CPAP is usually the best treatment for obstructive sleep apnea. It is the first treatment choice and the most widely used. Your doctor may suggest CPAP if you have:  Moderate to severe sleep apnea.  Sleep apnea and coronary artery disease (CAD) or heart failure.  How does it help?  CPAP can help you have more normal sleep, so you feel less sleepy and more alert during the daytime.  CPAP may help keep heart failure or other heart problems from getting worse.  NCPAP may help lower your blood pressure.  If you use CPAP, your bed partner may also sleep better because you are not snoring or restless.  What are the side effects?  Some people who use CPAP have:  A dry or stuffy nose and a sore throat.  Irritated skin on the face.  Sore eyes.  Bloating.  If you have any of these problems, work with your doctor to fix them. Here are some things you can try:  Be sure the mask or nasal prongs fit well.  See if your doctor can adjust the pressure of your CPAP.  If your nose is dry, try a humidifier.  If your nose is runny or stuffy, try decongestant medicine or a steroid

## 2025-04-17 NOTE — PROGRESS NOTES
[address], [place] [address]  Tel.  [phone]    Fax. [phone]     [address] [address]  Tel.  [phone]    Fax. [phone] [address] [place] [address]  Tel.  [phone]    Fax. [phone]       Mayra Askew (: 1985) is a 39 y.o. female, established patient, seen for positive airway pressure follow-up and evaluation of the following chief complaint(s):   Sleep Problem (6 month medication follow up)       Mayra Askew was last seen by me on 10/24/2024, prior notes reviewed in detail.      PSG(22): Apnea/Hypopnea index of 3.1 which indicates no signifiant sleep apnea.   HSAT(23): Indicative of an average AHI of 6.0 per hour with an SpO2 sherlyn of 89% and SpO2 of < 88% being 0.00 minutes. PAP device was prescribed.     MSLT(22): Moderate daytime sleepiness as evidenced by an average latency of 7:00 minutes, Stage REM was not noted in any of  the naps.      Urine Drug Screen (22): Negative.  Urine Drug Screen (23): Negative.  Urine Drug Screen (23): Positive for amphetamines.  Urine Drug Screen (10-24-24): Negative.    ASSESSMENT/PLAN:   Diagnosis Orders   1. Hypersomnia with sleep apnea  Armodafinil 200 MG TABS    DME Order for (Specify) as OP      2. Elevated blood pressure reading in office without diagnosis of hypertension        3. Medication management        4. Anxiety and depression        5. BMI 45.0-49.9, adult            On ResMed:  AirCurve 10 VAuto:    Settings:  Mode - VAuto    Max IPAP: 15 cmH2O    Min EPAP: 6 cmH2O    PS: 6 cmH2O    Sleep Apnea -   * She is adherent with PAP therapy and PAP continues to benefit patient and remains necessary for control of her sleep apnea. Continue on current pressures.    * Supplies for his device were ordered as noted below:    Orders Placed This Encounter    Armodafinil 200 MG TABS     Sig: Take 200 mg by mouth  Post-Op Assessment Note    CV Status:  Stable  Pain Score: 0    Pain management: adequate       Mental Status:  Alert   Hydration Status:  Euvolemic   PONV Controlled:  Controlled   Airway Patency:  Patent     Post Op Vitals Reviewed: Yes    No anethesia notable event occurred.    Staff: CRNA               BP   151/77   Temp   98.2   Pulse  136   Resp   18   SpO2  97%

## 2025-05-20 ENCOUNTER — CLINICAL DOCUMENTATION (OUTPATIENT)
Age: 40
End: 2025-05-20

## 2025-05-20 NOTE — PROGRESS NOTES
[employer] Armodafinil 200MG Tablet Prior Authorization Initiated and Submitted via Cover-My-Meds    Cover-My-Meds Key Code: [id number]    PA Case ID #: [id number]    Rx #: [id number]

## (undated) DEVICE — SUT ETHLN 3-0 18IN PS2 BLK --

## (undated) DEVICE — ZIMMER® STERILE DISPOSABLE TOURNIQUET CUFF WITH PROTECTIVE SLEEVE AND PLC, DUAL PORT, SINGLE BLADDER, 34 IN. (86 CM)

## (undated) DEVICE — BANDAGE COMPR M W6INXL10YD WHT BGE VELC E MTRX HK AND LOOP

## (undated) DEVICE — 4-PORT MANIFOLD: Brand: NEPTUNE 2

## (undated) DEVICE — DRAPE,REIN 53X77,STERILE: Brand: MEDLINE

## (undated) DEVICE — PADDING CAST STRL 6INX4YD --

## (undated) DEVICE — CONTINU-FLO SOLUTION SET, 2 INJECTION SITES, MALE LUER LOCK ADAPTER WITH RETRACTABLE COLLAR, LARGE BORE STOPCOCK WITH ROTATING MALE LUER LOCK EXTENSION SET, 2 INJECTION SITES, MALE LUER LOCK ADAPTER WITH RETRACTABLE COLLAR: Brand: INTERLINK/CONTINU-FLO

## (undated) DEVICE — SPONGE GZ W4XL4IN COT 12 PLY TYP VII WVN C FLD DSGN

## (undated) DEVICE — 3M™ TEGADERM™ TRANSPARENT FILM DRESSING FRAME STYLE, 1624W, 2-3/8 IN X 2-3/4 IN (6 CM X 7 CM), 100/CT 4CT/CASE: Brand: 3M™ TEGADERM™

## (undated) DEVICE — GLOVE ORANGE PI 8 1/2   MSG9085

## (undated) DEVICE — DRESSING,GAUZE,XEROFORM,CURAD,5"X9",ST: Brand: CURAD

## (undated) DEVICE — C-ARM: Brand: UNBRANDED

## (undated) DEVICE — PAD,ABDOMINAL,8"X10",ST,LF: Brand: MEDLINE

## (undated) DEVICE — EXTREMITY-MRMC: Brand: MEDLINE INDUSTRIES, INC.

## (undated) DEVICE — SUTURE VCRL SZ 2-0 L36IN ABSRB UD L36MM CT-1 1/2 CIR J945H

## (undated) DEVICE — DRESSING,GAUZE,XEROFORM,CURAD,1"X8",ST: Brand: CURAD

## (undated) DEVICE — PADDING CST 4IN STERILE --

## (undated) DEVICE — BANDAGE COBAN 4 IN COMPR W4INXL5YD FOAM COHESIVE QUIK STK SELF ADH SFT

## (undated) DEVICE — Device

## (undated) DEVICE — COVER,MAYO STAND,STERILE: Brand: MEDLINE

## (undated) DEVICE — SOLUTION IRRIG 1000ML 0.9% SOD CHL USP POUR PLAS BTL

## (undated) DEVICE — OLIVE WIRE, SMOOTH, 1.4MM: Brand: BABY GORILLA/GORILLA PLATING SYSTEM

## (undated) DEVICE — SUTURE MCRYL SZ 3-0 L27IN ABSRB UD L19MM PS-2 3/8 CIR PRIM Y427H

## (undated) DEVICE — SMOKE EVACUATION PENCIL: Brand: VALLEYLAB

## (undated) DEVICE — SOLUTION IRRIG 1000ML STRL H2O USP PLAS POUR BTL

## (undated) DEVICE — SYR 20ML LL STRL LF --

## (undated) DEVICE — TOTAL JOINT-MRMC: Brand: MEDLINE INDUSTRIES, INC.

## (undated) DEVICE — GLOVE SURG SZ 8 L12IN FNGR THK79MIL GRN LTX FREE

## (undated) DEVICE — KENDALL DL ECG CABLE AND LEAD WIRE SYSTEM, 3-LEAD, SINGLE PATIENT USE: Brand: KENDALL

## (undated) DEVICE — GLOVE SURG SZ 7 L12IN FNGR THK79MIL GRN LTX FREE

## (undated) DEVICE — SUTURE VCRL SZ 2-0 L18IN ABSRB UD CT-1 L36MM 1/2 CIR J839D

## (undated) DEVICE — COVER LT HNDL PLAS RIG 1 PER PK

## (undated) DEVICE — ZIMMER® STERILE DISPOSABLE TOURNIQUET CUFF WITH PLC, DUAL PORT, SINGLE BLADDER, 34 IN. (86 CM)

## (undated) DEVICE — KIT INSTR DRL GUID 1.6/1.35MM GUIDWIRE DISP FIBERTAK DX

## (undated) DEVICE — Device: Brand: JELCO

## (undated) DEVICE — PREP SKN CHLRAPRP APL 26ML STR --

## (undated) DEVICE — SUTURE VCRL SZ 1 L18IN ABSRB VLT CT-1 L36MM 1/2 CIR J741D

## (undated) DEVICE — C-ARMOR C-ARM EQUIPMENT COVERS CLEAR STERILE UNIVERSAL FIT 12 PER CASE: Brand: C-ARMOR

## (undated) DEVICE — TOWEL SURG W17XL27IN STD BLU COT NONFENESTRATED PREWASHED

## (undated) DEVICE — DRILL,  2.4 X 140MM, SOLID, MEASURING, LONG, AO: Brand: BABY GORILLA®/GORILLA® PLATING SYSTEM

## (undated) DEVICE — 3M™ IOBAN™ 2 ANTIMICROBIAL INCISE DRAPE 6651EZ: Brand: IOBAN™ 2

## (undated) DEVICE — 2.0MM DRILL BIT

## (undated) DEVICE — GLOVE SURG SZ 65 THK91MIL LTX FREE SYN POLYISOPRENE

## (undated) DEVICE — PAD ABSRB W8XL10IN ABD HYDROPHOBIC NONWOVEN THCK LAYR CELOS

## (undated) DEVICE — BASIC SINGLE BASIN BTC-LF: Brand: MEDLINE INDUSTRIES, INC.

## (undated) DEVICE — BNDG ELAS HK LOOP 4X5YD NS -- MATRIX

## (undated) DEVICE — TRAP FLUID BUFFALO FLTR

## (undated) DEVICE — SUTURE VCRL SZ 2-0 L27IN ABSRB UD L26MM SH 1/2 CIR J417H